# Patient Record
Sex: FEMALE | Race: WHITE | Employment: FULL TIME | URBAN - METROPOLITAN AREA
[De-identification: names, ages, dates, MRNs, and addresses within clinical notes are randomized per-mention and may not be internally consistent; named-entity substitution may affect disease eponyms.]

---

## 2017-01-28 ENCOUNTER — APPOINTMENT (OUTPATIENT)
Dept: CT IMAGING | Facility: CLINIC | Age: 42
End: 2017-01-28
Attending: EMERGENCY MEDICINE
Payer: COMMERCIAL

## 2017-01-28 ENCOUNTER — HOSPITAL ENCOUNTER (EMERGENCY)
Facility: CLINIC | Age: 42
Discharge: HOME | End: 2017-01-28
Attending: EMERGENCY MEDICINE | Admitting: EMERGENCY MEDICINE
Payer: COMMERCIAL

## 2017-01-28 VITALS
TEMPERATURE: 98.1 F | BODY MASS INDEX: 30.31 KG/M2 | DIASTOLIC BLOOD PRESSURE: 90 MMHG | OXYGEN SATURATION: 97 % | WEIGHT: 185 LBS | SYSTOLIC BLOOD PRESSURE: 144 MMHG | RESPIRATION RATE: 16 BRPM

## 2017-01-28 DIAGNOSIS — R22.0 FACIAL SWELLING: ICD-10-CM

## 2017-01-28 DIAGNOSIS — K12.2 ORAL CELLULITIS: ICD-10-CM

## 2017-01-28 LAB
ANION GAP SERPL CALCULATED.3IONS-SCNC: 8 MMOL/L (ref 3–14)
BASOPHILS # BLD AUTO: 0 10E9/L (ref 0–0.2)
BASOPHILS NFR BLD AUTO: 0.5 %
BUN SERPL-MCNC: 8 MG/DL (ref 7–30)
CALCIUM SERPL-MCNC: 7.7 MG/DL (ref 8.5–10.1)
CHLORIDE SERPL-SCNC: 113 MMOL/L (ref 94–109)
CO2 SERPL-SCNC: 23 MMOL/L (ref 20–32)
CREAT SERPL-MCNC: 0.66 MG/DL (ref 0.52–1.04)
DIFFERENTIAL METHOD BLD: NORMAL
EOSINOPHIL # BLD AUTO: 0.1 10E9/L (ref 0–0.7)
EOSINOPHIL NFR BLD AUTO: 1 %
ERYTHROCYTE [DISTWIDTH] IN BLOOD BY AUTOMATED COUNT: 12.5 % (ref 10–15)
GFR SERPL CREATININE-BSD FRML MDRD: ABNORMAL ML/MIN/1.7M2
GLUCOSE SERPL-MCNC: 70 MG/DL (ref 70–99)
HCT VFR BLD AUTO: 35.7 % (ref 35–47)
HGB BLD-MCNC: 11.9 G/DL (ref 11.7–15.7)
IMM GRANULOCYTES # BLD: 0 10E9/L (ref 0–0.4)
IMM GRANULOCYTES NFR BLD: 0.2 %
LYMPHOCYTES # BLD AUTO: 1.9 10E9/L (ref 0.8–5.3)
LYMPHOCYTES NFR BLD AUTO: 30 %
MCH RBC QN AUTO: 30.1 PG (ref 26.5–33)
MCHC RBC AUTO-ENTMCNC: 33.3 G/DL (ref 31.5–36.5)
MCV RBC AUTO: 90 FL (ref 78–100)
MONOCYTES # BLD AUTO: 0.4 10E9/L (ref 0–1.3)
MONOCYTES NFR BLD AUTO: 6.4 %
NEUTROPHILS # BLD AUTO: 3.9 10E9/L (ref 1.6–8.3)
NEUTROPHILS NFR BLD AUTO: 61.9 %
PLATELET # BLD AUTO: 204 10E9/L (ref 150–450)
POTASSIUM SERPL-SCNC: 3.3 MMOL/L (ref 3.4–5.3)
RBC # BLD AUTO: 3.96 10E12/L (ref 3.8–5.2)
SODIUM SERPL-SCNC: 144 MMOL/L (ref 133–144)
WBC # BLD AUTO: 6.3 10E9/L (ref 4–11)

## 2017-01-28 PROCEDURE — 99284 EMERGENCY DEPT VISIT MOD MDM: CPT | Performed by: EMERGENCY MEDICINE

## 2017-01-28 PROCEDURE — 99285 EMERGENCY DEPT VISIT HI MDM: CPT | Mod: 25

## 2017-01-28 PROCEDURE — 96365 THER/PROPH/DIAG IV INF INIT: CPT | Mod: 59

## 2017-01-28 PROCEDURE — 80048 BASIC METABOLIC PNL TOTAL CA: CPT | Performed by: EMERGENCY MEDICINE

## 2017-01-28 PROCEDURE — 25000128 H RX IP 250 OP 636: Performed by: EMERGENCY MEDICINE

## 2017-01-28 PROCEDURE — 96375 TX/PRO/DX INJ NEW DRUG ADDON: CPT | Mod: 59

## 2017-01-28 PROCEDURE — 25500064 ZZH RX 255 OP 636: Performed by: RADIOLOGY

## 2017-01-28 PROCEDURE — 25000125 ZZHC RX 250: Performed by: EMERGENCY MEDICINE

## 2017-01-28 PROCEDURE — 70487 CT MAXILLOFACIAL W/DYE: CPT

## 2017-01-28 PROCEDURE — 25000125 ZZHC RX 250: Performed by: RADIOLOGY

## 2017-01-28 PROCEDURE — 85025 COMPLETE CBC W/AUTO DIFF WBC: CPT | Performed by: EMERGENCY MEDICINE

## 2017-01-28 PROCEDURE — S0077 INJECTION, CLINDAMYCIN PHOSP: HCPCS | Performed by: EMERGENCY MEDICINE

## 2017-01-28 PROCEDURE — 96361 HYDRATE IV INFUSION ADD-ON: CPT

## 2017-01-28 RX ORDER — DEXAMETHASONE SODIUM PHOSPHATE 10 MG/ML
10 INJECTION, SOLUTION INTRAMUSCULAR; INTRAVENOUS ONCE
Status: COMPLETED | OUTPATIENT
Start: 2017-01-28 | End: 2017-01-28

## 2017-01-28 RX ORDER — IOPAMIDOL 755 MG/ML
80 INJECTION, SOLUTION INTRAVASCULAR ONCE
Status: COMPLETED | OUTPATIENT
Start: 2017-01-28 | End: 2017-01-28

## 2017-01-28 RX ORDER — DIPHENHYDRAMINE HYDROCHLORIDE 50 MG/ML
50 INJECTION INTRAMUSCULAR; INTRAVENOUS ONCE
Status: COMPLETED | OUTPATIENT
Start: 2017-01-28 | End: 2017-01-28

## 2017-01-28 RX ORDER — SODIUM CHLORIDE 9 MG/ML
1000 INJECTION, SOLUTION INTRAVENOUS CONTINUOUS
Status: DISCONTINUED | OUTPATIENT
Start: 2017-01-28 | End: 2017-01-28 | Stop reason: HOSPADM

## 2017-01-28 RX ORDER — CLINDAMYCIN HCL 300 MG
300 CAPSULE ORAL 3 TIMES DAILY
Qty: 21 CAPSULE | Refills: 0 | Status: SHIPPED | OUTPATIENT
Start: 2017-01-28 | End: 2017-02-04

## 2017-01-28 RX ORDER — CLINDAMYCIN PHOSPHATE 900 MG/50ML
900 INJECTION, SOLUTION INTRAVENOUS EVERY 8 HOURS
Status: DISCONTINUED | OUTPATIENT
Start: 2017-01-28 | End: 2017-01-28 | Stop reason: HOSPADM

## 2017-01-28 RX ADMIN — DIPHENHYDRAMINE HYDROCHLORIDE 50 MG: 50 INJECTION, SOLUTION INTRAMUSCULAR; INTRAVENOUS at 12:12

## 2017-01-28 RX ADMIN — CLINDAMYCIN PHOSPHATE 900 MG: 18 INJECTION, SOLUTION INTRAVENOUS at 14:18

## 2017-01-28 RX ADMIN — SODIUM CHLORIDE 1000 ML: 9 INJECTION, SOLUTION INTRAVENOUS at 12:13

## 2017-01-28 RX ADMIN — SODIUM CHLORIDE 80 ML: 9 INJECTION, SOLUTION INTRAVENOUS at 12:36

## 2017-01-28 RX ADMIN — IOPAMIDOL 80 ML: 755 INJECTION, SOLUTION INTRAVENOUS at 12:36

## 2017-01-28 RX ADMIN — DEXAMETHASONE SODIUM PHOSPHATE 10 MG: 10 INJECTION, SOLUTION INTRAMUSCULAR; INTRAVENOUS at 12:29

## 2017-01-28 ASSESSMENT — ENCOUNTER SYMPTOMS
CHILLS: 0
FEVER: 0
SPEECH DIFFICULTY: 0
SHORTNESS OF BREATH: 0
TROUBLE SWALLOWING: 0
VOICE CHANGE: 0
RHINORRHEA: 0

## 2017-01-28 NOTE — ED NOTES
Patient denies airway compromise or swallowing difficulties, patient states that the swelling on L side of her face is worse today and she noticed it more in her lips. She states that for the last week or so she has noticed some teeth sensitivity and teeth pain on the L side. Denies cracked teeth or injury to teeth. No redness noted, denies pain.

## 2017-01-28 NOTE — ED AVS SNAPSHOT
[facility] Emergency Department    [address] [address]    Phone:  [phone]    Fax:  [phone]                                       Dorina Lee   MRN: [id number]    Department:  [facility] Emergency Department   Date of Visit:  1/28/2017           After Visit Summary Signature Page     I have received my discharge instructions, and my questions have been answered. I have discussed any challenges I see with this plan with the nurse or doctor.    ..........................................................................................................................................  Patient/Patient Representative Signature      ..........................................................................................................................................  Patient Representative Print Name and Relationship to Patient    ..................................................               ................................................  Date                                            Time    ..........................................................................................................................................  Reviewed by Signature/Title    ...................................................              ..............................................  Date                                                            Time

## 2017-01-28 NOTE — DISCHARGE INSTRUCTIONS
Facial Cellulitis  Cellulitis is an infection of the deep layers of skin. A break in the skin, such as a cut or scratch, can let bacteria under the skin. It may also occur from an infected oil gland (pimple) or hair follicle. If the bacteria get to deep layers of the skin, it can be serious. If not treated, cellulitis can get into the bloodstream and lymph nodes. The infection can then spread throughout the body. This causes serious illness.  Cellulitis causes the affected skin to become red, swollen, warm, and sore. The reddened areas have a visible border. An open sore may leak fluid (pus). You may have a fever, chills, and pain.  Cellulitis is treated with antibiotics taken for 7 to 10 days. An open sore may be cleaned and covered with cool wet gauze. Symptoms should get better 1 to 2 days after treatment is started. Make sure to take all the antibiotics for the full number of days until they are gone. Keep taking the medication even if your symptoms go away.  Home care  Follow these tips:    Take all of the antibiotic medicine exactly as directed until it is gone. Don t miss any doses, especially during the first 7 days. Don t stop taking it when your symptoms get better.    Use a cool compress (face cloth soaked in cool water) on your face to help reduce swelling and pain.    You may use acetaminophen or ibuprofen to reduce pain. Don t use these if you have chronic liver or kidney disease, or ever had a stomach ulcer or GI bleeding. Talk with your healthcare provider first.  Follow-up care  Follow up with your healthcare provider. If your infection does not go away on 1 antibiotic, your healthcare provider will prescribe a different one.  When to seek medical advice  Call your healthcare provider right away if any of these occur:    Fever higher of 100.4  F (38.0  C) or higher after 2 days on antibiotics    Red areas that spread    Swelling or pain that gets worse    Fluid leaking from the skin (pus)    An  eyelid that swells shut or leaks fluid (pus)    Headache or neck pain that gets worse    Unusual drowsiness or confusion    Convulsions (seizure)    Change in eyesight             4494-8235 [employer]. [address] [place] [address], [place] [address]. All rights reserved. This information is not intended as a substitute for professional medical care. Always follow your healthcare professional's instructions.

## 2017-01-28 NOTE — ED NOTES
facial swelling began yesterday and is worse today with lips involved - no problem with breathing or swallowing

## 2017-01-28 NOTE — ED PROVIDER NOTES
History     Chief Complaint   Patient presents with     Facial Swelling     facial swelling began yesterday and is worse today with lips involved - no problem with breathing or swallowing     HPI  Dorina Lee is a 41 year old female who presents to the ED for evaluation of facial swelling noticed when she woke up yesterday morning. This morning her face was still swollen and had spread to her upper lip. She reports the swelling is not painful but she does have some pain in her upper left teeth. Speech and voice are normal. Denies fevers, chills, sinus congestion or drainage, trouble breathing, or shortness of breath. Denies any new foods, medication, lotions/soaps, or clothing. She has never had swelling like this before.       No past medical history on file.  Patient Active Problem List   Diagnosis     External bleeding hemorrhoids     Routine general medical examination at a health care facility     Current Facility-Administered Medications   Medication     0.9% sodium chloride infusion     clindamycin (CLEOCIN) infusion 900 mg     Current Outpatient Prescriptions   Medication     IBUPROFEN PO     clindamycin (CLEOCIN) 300 MG capsule      No Known Allergies  Social History     Social History     Marital Status:      Spouse Name: N/A     Number of Children: N/A     Years of Education: N/A     Occupational History     Not on file.     Social History Main Topics     Smoking status: Former Smoker -- 0.50 packs/day     Quit date: 01/01/2007     Smokeless tobacco: Never Used      Comment: smoked for 10 years     Alcohol Use: Yes      Comment: one drink every 6 months     Drug Use: No     Sexual Activity:     Partners: Male     Other Topics Concern      Service No     Caffeine Concern No     2 pops and 2 coffees per day     Occupational Exposure No     Hobby Hazards No     Sleep Concern No     Stress Concern No     Weight Concern No     Special Diet No     Back Care No     Exercise No      Bike Helmet No     Seat Belt Yes     Self-Exams Yes     Parent/Sibling W/ Cabg, Mi Or Angioplasty Before 65f 55m? No     Social History Narrative     Family History   Problem Relation Age of Onset     Unknown/Adopted Other      Neurologic Disorder Mother      Desire's  at 41     Neurologic Disorder Maternal Grandmother      desire's      CANCER Father      I have reviewed the Medications, Allergies, Past Medical and Surgical History, and Social History in the Epic system.    Review of Systems   Constitutional: Negative for fever and chills.   HENT: Positive for dental problem (tenderness of upper left teeth). Negative for congestion, rhinorrhea, trouble swallowing and voice change.    Respiratory: Negative for shortness of breath.    Neurological: Negative for speech difficulty.   All other systems reviewed and are negative.      Physical Exam   BP: (!) 176/116 mmHg  Heart Rate: 73  Temp: 98.1  F (36.7  C)  Resp: 16  Weight: 83.915 kg (185 lb)  SpO2: 99 %  Physical Exam Gen. alert cooperative female in mild distress.  HEENT showed pupils are equal to light and accommodation.  Extraocular motions are intact.  There is no injection or mattering.  Ears are clear bilaterally.  There is subtle swelling under the left eye and onto the cheek area.  There is no open draining lesions or blisters noted.  In the left nasal passage there is a polypoid swelling that may be a polyp or turbinate that is inflamed.  This is not noted in the right side however.  Orally her dentition is in good repair.  Mucosa is moist.  On palpation of the left upper gumline she is mildly tender.  It is erythematous but nonfluctuant.  Patient has no malocclusion.  Neck reveals no adenopathy or stridor.  There is no swelling.  No meningismus.  On auscultation of lungs or wheezing.  Cardiac exam is normal.  She has no other rashes or lesions.    ED Course   Procedures         Results for orders placed or performed during the hospital  encounter of 01/28/17   CT Maxillofacial w Contrast    Narrative    CT OF THE FACE WITH CONTRAST 1/28/2017 12:45 PM     COMPARISON: None.    HISTORY: Left facial swelling/polypoid lesion in the left nasal  passage.    TECHNIQUE:  Helical thin-section axial CT images of the face were  acquired after the administration of 80 mL Isovue 370 intravenous  contrast. Coronal reconstructions were created from the axial source  data.    FINDINGS: There is no evidence for mass, fluid collection, or abscess  anywhere in the visualized portions of the face or upper neck. There  is mild inflammatory fat stranding of the left cheek consistent with  cellulitis. There is no evidence for mass lesion or bony abnormality  in the nasal cavity on either side. The orbits and globes bilaterally  are unremarkable. The paranasal sinuses are well aerated. There are no  fractures of the visualized bones.      Impression    IMPRESSION:  1. Inflammatory fat stranding of the left cheek consistent with  cellulitis.  2. No evidence for mass, fluid collection or lymphadenopathy in the  visualized portions of the face or upper neck.    Radiation dose for this scan was reduced using automated exposure  control, adjustment of the mA and/or kV according to patient size, or  iterative reconstruction technique.            Critical Care time:  none               Labs Ordered and Resulted from Time of ED Arrival Up to the Time of Departure from the ED   BASIC METABOLIC PANEL - Abnormal; Notable for the following:     Potassium 3.3 (*)     Chloride 113 (*)     Calcium 7.7 (*)     All other components within normal limits   CBC WITH PLATELETS DIFFERENTIAL       Results for orders placed or performed during the hospital encounter of 01/28/17 (from the past 24 hour(s))   CT Maxillofacial w Contrast    Narrative    CT OF THE FACE WITH CONTRAST 1/28/2017 12:45 PM     COMPARISON: None.    HISTORY: Left facial swelling/polypoid lesion in the left  nasal  passage.    TECHNIQUE:  Helical thin-section axial CT images of the face were  acquired after the administration of 80 mL Isovue 370 intravenous  contrast. Coronal reconstructions were created from the axial source  data.    FINDINGS: There is no evidence for mass, fluid collection, or abscess  anywhere in the visualized portions of the face or upper neck. There  is mild inflammatory fat stranding of the left cheek consistent with  cellulitis. There is no evidence for mass lesion or bony abnormality  in the nasal cavity on either side. The orbits and globes bilaterally  are unremarkable. The paranasal sinuses are well aerated. There are no  fractures of the visualized bones.      Impression    IMPRESSION:  1. Inflammatory fat stranding of the left cheek consistent with  cellulitis.  2. No evidence for mass, fluid collection or lymphadenopathy in the  visualized portions of the face or upper neck.    Radiation dose for this scan was reduced using automated exposure  control, adjustment of the mA and/or kV according to patient size, or  iterative reconstruction technique.   CBC with platelets differential   Result Value Ref Range    WBC 6.3 4.0 - 11.0 10e9/L    RBC Count 3.96 3.8 - 5.2 10e12/L    Hemoglobin 11.9 11.7 - 15.7 g/dL    Hematocrit 35.7 35.0 - 47.0 %    MCV 90 78 - 100 fl    MCH 30.1 26.5 - 33.0 pg    MCHC 33.3 31.5 - 36.5 g/dL    RDW 12.5 10.0 - 15.0 %    Platelet Count 204 150 - 450 10e9/L    Diff Method Automated Method     % Neutrophils 61.9 %    % Lymphocytes 30.0 %    % Monocytes 6.4 %    % Eosinophils 1.0 %    % Basophils 0.5 %    % Immature Granulocytes 0.2 %    Absolute Neutrophil 3.9 1.6 - 8.3 10e9/L    Absolute Lymphocytes 1.9 0.8 - 5.3 10e9/L    Absolute Monocytes 0.4 0.0 - 1.3 10e9/L    Absolute Eosinophils 0.1 0.0 - 0.7 10e9/L    Absolute Basophils 0.0 0.0 - 0.2 10e9/L    Abs Immature Granulocytes 0.0 0 - 0.4 10e9/L   Basic metabolic panel   Result Value Ref Range    Sodium 144 133 -  144 mmol/L    Potassium 3.3 (L) 3.4 - 5.3 mmol/L    Chloride 113 (H) 94 - 109 mmol/L    Carbon Dioxide 23 20 - 32 mmol/L    Anion Gap 8 3 - 14 mmol/L    Glucose 70 70 - 99 mg/dL    Urea Nitrogen 8 7 - 30 mg/dL    Creatinine 0.66 0.52 - 1.04 mg/dL    GFR Estimate >90  Non  GFR Calc   >60 mL/min/1.7m2    GFR Estimate If Black >90   GFR Calc   >60 mL/min/1.7m2    Calcium 7.7 (L) 8.5 - 10.1 mg/dL       Medications   0.9% sodium chloride BOLUS (0 mLs Intravenous Stopped 1/28/17 1413)     Followed by   0.9% sodium chloride infusion (not administered)   clindamycin (CLEOCIN) infusion 900 mg (0 mg Intravenous Stopped 1/28/17 1507)   diphenhydrAMINE (BENADRYL) injection 50 mg (50 mg Intravenous Given 1/28/17 1212)   dexamethasone (DECADRON) injection 10 mg (10 mg Intravenous Given 1/28/17 1229)   iopamidol (ISOVUE-370) solution 80 mL (80 mLs Intravenous Given 1/28/17 1236)   sodium chloride 0.9 % for CT scan flush dose 80 mL (80 mLs Intravenous Given 1/28/17 1236)       11:34 AM Patient assessed.  Patient had the IV established and was given Benadryl and steroids for possible allergic reaction.  With the left nares lesion being somewhat irregular for a polyp or turbinate and no evidence of similar disease in the right nares a CT of the face with contrast was obtained.  Results of the CT showed inflammatory stranding of the left cheek which was consistent with cellulitis.  There was no evidence of mass, fluid collection or lymphadenopathy in the slice portion of the face or upper neck.  They did not notice any worrisome mass lesion or bony abnormality in the nasal cavity.  Patient was informed of these findings.  She is given IV clindamycin to cover for oral gloria.  Assessments & Plan (with Medical Decision Making)   Patient is a 40-year-old female presents with one-day history of left facial swelling.  Began yesterday but progressed now to the point with the upper lip is involved.  Mild  discomfort of her upper gumline on the left.  No difficulty with speech or swallowing.  No shortness of breath or wheezing.  No similar presentations previously.  No history of sinus problems and currently denies any sinus discharge.  No fever or chills.  No treatment prior to arrival.  On exam patient is subtle swelling from below her left eye onto the cheek area.  Her upper lip also looks slightly swollen compared to the right side.  Initially patient was treated for allergic reaction with Benadryl and steroids.  However with the abnormal finding in her left nares a CT was obtained to assess.  This did show some fat stranding in the cheek but did not show evidence of abscess.  There is no worrisome lesion noted in the nasal passages and no bony abnormalities.  Patient is a former smoker.  Patient was treated for probable cellulitis.  She is given clindamycin to cover for oral gloria.  Handout on oral cellulitis is provided.  Reasons to return for reassessment I've discussed.  I have reviewed the nursing notes.    I have reviewed the findings, diagnosis, plan and need for follow up with the patient.    New Prescriptions    CLINDAMYCIN (CLEOCIN) 300 MG CAPSULE    Take 1 capsule (300 mg) by mouth 3 times daily for 7 days       Final diagnoses:   Facial swelling   Oral cellulitis     This document serves as a record of the services and decisions personally performed and made by Vasiliy Barron MD. It was created on HIS/HER behalf by Ashanti Aguilar, a trained medical scribe. The creation of this document is based the provider's statements to the medical scribe.  Ashanti Aguilar 11:34 AM 1/28/2017    Provider:   The information in this document, created by the medical scribe for me, accurately reflects the services I personally performed and the decisions made by me. I have reviewed and approved this document for accuracy prior to leaving the patient care area.  Vasiliy Barron MD 11:34 AM 1/28/2017 1/28/2017   [facility]  EMERGENCY DEPARTMENT      Vasiliy Barron MD  01/28/17 [id number]

## 2017-01-28 NOTE — ED AVS SNAPSHOT
[facility]    [address] [facility] [address]    Phone:  [phone]    Fax:  [phone]                                       Dorina Lee   MRN: [id number]    Department:  [facility]   Date of Visit:  1/28/2017           Patient Information     Date Of Birth          1975        Your diagnoses for this visit were:     Facial swelling     Oral cellulitis        You were seen by Vasiliy Barron MD.      Follow-up Information     Follow up with Clinic, [facility].    Why:  As needed    Contact information:    [address]  [facility] [address] [phone]          Discharge Instructions         Facial Cellulitis  Cellulitis is an infection of the deep layers of skin. A break in the skin, such as a cut or scratch, can let bacteria under the skin. It may also occur from an infected oil gland (pimple) or hair follicle. If the bacteria get to deep layers of the skin, it can be serious. If not treated, cellulitis can get into the bloodstream and lymph nodes. The infection can then spread throughout the body. This causes serious illness.  Cellulitis causes the affected skin to become red, swollen, warm, and sore. The reddened areas have a visible border. An open sore may leak fluid (pus). You may have a fever, chills, and pain.  Cellulitis is treated with antibiotics taken for 7 to 10 days. An open sore may be cleaned and covered with cool wet gauze. Symptoms should get better 1 to 2 days after treatment is started. Make sure to take all the antibiotics for the full number of days until they are gone. Keep taking the medication even if your symptoms go away.  Home care  Follow these tips:    Take all of the antibiotic medicine exactly as directed until it is gone. Don t miss any doses, especially during the first 7 days. Don t stop taking it when your symptoms get better.    Use a cool compress (face cloth soaked in  cool water) on your face to help reduce swelling and pain.    You may use acetaminophen or ibuprofen to reduce pain. Don t use these if you have chronic liver or kidney disease, or ever had a stomach ulcer or GI bleeding. Talk with your healthcare provider first.  Follow-up care  Follow up with your healthcare provider. If your infection does not go away on 1 antibiotic, your healthcare provider will prescribe a different one.  When to seek medical advice  Call your healthcare provider right away if any of these occur:    Fever higher of 100.4  F (38.0  C) or higher after 2 days on antibiotics    Red areas that spread    Swelling or pain that gets worse    Fluid leaking from the skin (pus)    An eyelid that swells shut or leaks fluid (pus)    Headache or neck pain that gets worse    Unusual drowsiness or confusion    Convulsions (seizure)    Change in eyesight             8668-1434 [employer]. [address] [place] [address]. All rights reserved. This information is not intended as a substitute for professional medical care. Always follow your healthcare professional's instructions.          24 Hour Appointment Hotline       To make an appointment at any [facility], call [phone] ([phone]). If you don't have a family doctor or clinic, we will help you find one. [facility] are conveniently located to serve the needs of you and your family.             Review of your medicines      START taking        Dose / Directions Last dose taken    clindamycin 300 MG capsule   Commonly known as:  CLEOCIN   Dose:  300 mg   Quantity:  21 capsule        Take 1 capsule (300 mg) by mouth 3 times daily for 7 days   Refills:  0          Our records show that you are taking the medicines listed below. If these are incorrect, please call your family doctor or clinic.        Dose / Directions Last dose taken    IBUPROFEN PO   Dose:  600 mg        Take 600 mg by mouth daily as needed for  moderate pain   Refills:  0                Prescriptions were sent or printed at these locations (1 Prescription)                   [facility] [place], [place] - [address]   [address], [place] [address]    Telephone:  [phone]   Fax:  [phone]   Hours:                  E-Prescribed (1 of 1)         clindamycin (CLEOCIN) 300 MG capsule                Procedures and tests performed during your visit     Basic metabolic panel    CBC with platelets differential    CT Maxillofacial w Contrast      Orders Needing Specimen Collection     None      Pending Results     Date and Time Order Name Status Description    1/28/2017 1210 CT Maxillofacial w Contrast Preliminary             Pending Culture Results     No orders found from 1/27/2017 to 1/29/2017.       Test Results from your hospital stay           1/28/2017  2:02 PM - Interface, Radiant Ib      Narrative     CT OF THE FACE WITH CONTRAST 1/28/2017 12:45 PM     COMPARISON: None.    HISTORY: Left facial swelling/polypoid lesion in the left nasal  passage.    TECHNIQUE:  Helical thin-section axial CT images of the face were  acquired after the administration of 80 mL Isovue 370 intravenous  contrast. Coronal reconstructions were created from the axial source  data.    FINDINGS: There is no evidence for mass, fluid collection, or abscess  anywhere in the visualized portions of the face or upper neck. There  is mild inflammatory fat stranding of the left cheek consistent with  cellulitis. There is no evidence for mass lesion or bony abnormality  in the nasal cavity on either side. The orbits and globes bilaterally  are unremarkable. The paranasal sinuses are well aerated. There are no  fractures of the visualized bones.        Impression     IMPRESSION:  1. Inflammatory fat stranding of the left cheek consistent with  cellulitis.  2. No evidence for mass, fluid collection or lymphadenopathy in the  visualized portions of the face or  upper neck.    Radiation dose for this scan was reduced using automated exposure  control, adjustment of the mA and/or kV according to patient size, or  iterative reconstruction technique.         1/28/2017  1:34 PM - Interface, Flexilab Results      Component Results     Component Value Ref Range & Units Status    WBC 6.3 4.0 - 11.0 10e9/L Final    RBC Count 3.96 3.8 - 5.2 10e12/L Final    Hemoglobin 11.9 11.7 - 15.7 g/dL Final    Hematocrit 35.7 35.0 - 47.0 % Final    MCV 90 78 - 100 fl Final    MCH 30.1 26.5 - 33.0 pg Final    MCHC 33.3 31.5 - 36.5 g/dL Final    RDW 12.5 10.0 - 15.0 % Final    Platelet Count 204 150 - 450 10e9/L Final    Diff Method Automated Method  Final    % Neutrophils 61.9 % Final    % Lymphocytes 30.0 % Final    % Monocytes 6.4 % Final    % Eosinophils 1.0 % Final    % Basophils 0.5 % Final    % Immature Granulocytes 0.2 % Final    Absolute Neutrophil 3.9 1.6 - 8.3 10e9/L Final    Absolute Lymphocytes 1.9 0.8 - 5.3 10e9/L Final    Absolute Monocytes 0.4 0.0 - 1.3 10e9/L Final    Absolute Eosinophils 0.1 0.0 - 0.7 10e9/L Final    Absolute Basophils 0.0 0.0 - 0.2 10e9/L Final    Abs Immature Granulocytes 0.0 0 - 0.4 10e9/L Final         1/28/2017  1:40 PM - Interface, Flexilab Results      Component Results     Component Value Ref Range & Units Status    Sodium 144 133 - 144 mmol/L Final    Potassium 3.3 (L) 3.4 - 5.3 mmol/L Final    Chloride 113 (H) 94 - 109 mmol/L Final    Carbon Dioxide 23 20 - 32 mmol/L Final    Anion Gap 8 3 - 14 mmol/L Final    Glucose 70 70 - 99 mg/dL Final    Urea Nitrogen 8 7 - 30 mg/dL Final    Creatinine 0.66 0.52 - 1.04 mg/dL Final    GFR Estimate >90  Non  GFR Calc   >60 mL/min/1.7m2 Final    GFR Estimate If Black >90   GFR Calc   >60 mL/min/1.7m2 Final    Calcium 7.7 (L) 8.5 - 10.1 mg/dL Final                Thank you for choosing [facility]       Thank you for choosing [facility] for your care. Our goal is always to provide you with  "excellent care. Hearing back from our patients is one way we can continue to improve our services. Please take a few minutes to complete the written survey that you may receive in the mail after you visit with us. Thank you!        [employer] Information     [employer] lets you send messages to your doctor, view your test results, renew your prescriptions, schedule appointments and more. To sign up, go to [email or web address] . Click on \"Log in\" on the left side of the screen, which will take you to the Welcome page. Then click on \"Sign up Now\" on the right side of the page.     You will be asked to enter the access code listed below, as well as some personal information. Please follow the directions to create your username and password.     Your access code is: [email or web address]  Expires: 2017  3:28 PM     Your access code will  in 90 days. If you need help or a new code, please call your [place] clinic or [phone].        Care EveryWhere ID     This is your Care EveryWhere ID. This could be used by other organizations to access your [place] medical records  [id number]        After Visit Summary       This is your record. Keep this with you and show to your community pharmacist(s) and doctor(s) at your next visit.                  "

## 2017-06-30 ENCOUNTER — OFFICE VISIT (OUTPATIENT)
Dept: FAMILY MEDICINE | Facility: CLINIC | Age: 42
End: 2017-06-30
Payer: COMMERCIAL

## 2017-06-30 VITALS
HEIGHT: 66 IN | DIASTOLIC BLOOD PRESSURE: 97 MMHG | HEART RATE: 73 BPM | SYSTOLIC BLOOD PRESSURE: 148 MMHG | WEIGHT: 196.7 LBS | TEMPERATURE: 98.3 F | BODY MASS INDEX: 31.61 KG/M2

## 2017-06-30 DIAGNOSIS — K13.79 ORAL PAIN: ICD-10-CM

## 2017-06-30 DIAGNOSIS — L60.0 INGROWN NAIL: ICD-10-CM

## 2017-06-30 DIAGNOSIS — L03.032 PARONYCHIA OF GREAT TOE, LEFT: Primary | ICD-10-CM

## 2017-06-30 PROCEDURE — 99213 OFFICE O/P EST LOW 20 MIN: CPT | Performed by: NURSE PRACTITIONER

## 2017-06-30 RX ORDER — CEPHALEXIN 500 MG/1
500 CAPSULE ORAL 3 TIMES DAILY
Qty: 21 CAPSULE | Refills: 0 | Status: SHIPPED | OUTPATIENT
Start: 2017-06-30 | End: 2017-07-06

## 2017-06-30 NOTE — PROGRESS NOTES
"  SUBJECTIVE:                                                    Dorina Lee is a 42 year old female who presents to clinic today for the following health issues:  Swelling and erythema of the left great toe. History of ingrown toenail.   Also complains of sore in the mouth, upper left gum area, recently had facial swelling on the same left side and was seen in ED, was diagnosed with facial cellulitis, treated with Cleocin.     Concern - infected toe   Onset: one month     Description:   Swollen, red     Intensity: 5/10    Progression of Symptoms:  same    Accompanying Signs & Symptoms:  None     Previous history of similar problem:   No    Precipitating factors:   Worsened by: half way through the day she is in pain     Alleviating factors:  Improved by: Nothing     Therapies Tried and outcome: Soaked it in epsalm salt     Problem list and histories reviewed & adjusted, as indicated.  Additional history: as documented    Labs reviewed in EPIC    Reviewed and updated as needed this visit by clinical staff  Tobacco  Allergies  Med Hx  Surg Hx  Fam Hx  Soc Hx      Reviewed and updated as needed this visit by Provider         ROS:  Constitutional, HEENT, cardiovascular, pulmonary, gi and gu systems are negative, except as otherwise noted.    OBJECTIVE:     BP (!) 148/97  Pulse 73  Temp 98.3  F (36.8  C) (Tympanic)  Ht 5' 5.5\" (1.664 m)  Wt 196 lb 11.2 oz (89.2 kg)  BMI 32.23 kg/m2  Body mass index is 32.23 kg/(m^2).  GENERAL: healthy, alert and no distress  HENT: oropharynx clear and oral mucous membranes moist, there is very small area of swelling on the left upper gum area, no erythema and discharge, possibly infected tooth, recommended dentist evaluation    MS: left great toe erythema and swelling and ingrown toenail   PSYCH: mentation appears normal, affect normal/bright    Diagnostic Test Results:  none     ASSESSMENT/PLAN:     1. Paronychia of great toe, left  -tried over the counter " Bacitracin cream without improvement   - cephALEXin (KEFLEX) 500 MG capsule; Take 1 capsule (500 mg) by mouth 3 times daily for 7 days  Dispense: 21 capsule; Refill: 0    2. Ingrown nail  -recommended to schedule with either podiatrist, or Dr. Gay   - ORTHO  REFERRAL    3. Oral pain  -possible tooth infection, recommended to see dentist as soon as possible  -start Keflex     See Patient Instructions    NEDRA Wolfe [facility]

## 2017-06-30 NOTE — PATIENT INSTRUCTIONS
Soak feet  Keflex 1 tablet 3 times daily for 7 days    See podiatrist, or Dr. Gay for ingrown toenail removal      See dentist

## 2017-06-30 NOTE — MR AVS SNAPSHOT
After Visit Summary   6/30/2017    Dorina Lee    MRN: [id number]           Patient Information     Date Of Birth          1975        Visit Information        Provider Department      6/30/2017 12:40 PM Estefania Nolen APRN CNP [facility]        Today's Diagnoses     Paronychia of great toe, left    -  1    Ingrown nail          Care Instructions    Soak feet  Keflex 1 tablet 3 times daily for 7 days    See podiatrist, or Dr. Gay for ingrown toenail removal      See dentist                  Follow-ups after your visit        Additional Services     ORTHO  REFERRAL       [facility] is referring you to the Orthopedic  Services at [facility].       The  Representative will assist you in the coordination of your Orthopedic and Musculoskeletal Care as prescribed by your physician.    The  Representative will call you within 1 business day to help schedule your appointment, or you may contact the  Representative at:    All areas ~ [phone]     Type of Referral : [facility]     Podiatrist for ingrown toenail removal   Timeframe requested: 1 - 2 days    Coverage of these services is subject to the terms and limitations of your health insurance plan.  Please call member services at your health plan with any benefit or coverage questions.      If X-rays, CT or MRI's have been performed, please contact the facility where they were done to arrange for , prior to your scheduled appointment.  Please bring this referral request to your appointment and present it to your specialist.                  Who to contact     If you have questions or need follow up information about today's clinic visit or your schedule please contact [facility] directly at [phone].  Normal or non-critical lab and imaging results will be communicated to  "you by MyChart, letter or phone within 4 business days after the clinic has received the results. If you do not hear from us within 7 days, please contact the clinic through [employer] or phone. If you have a critical or abnormal lab result, we will notify you by phone as soon as possible.  Submit refill requests through [employer] or call your pharmacy and they will forward the refill request to us. Please allow 3 business days for your refill to be completed.          Additional Information About Your Visit        [employer] Information     [employer] lets you send messages to your doctor, view your test results, renew your prescriptions, schedule appointments and more. To sign up, go to [email or web address] . Click on \"Log in\" on the left side of the screen, which will take you to the Welcome page. Then click on \"Sign up Now\" on the right side of the page.     You will be asked to enter the access code listed below, as well as some personal information. Please follow the directions to create your username and password.     Your access code is: [email or web address]  Expires: 2017  1:01 PM     Your access code will  in 90 days. If you need help or a new code, please call your [facility] clinic or [phone].        Care EveryWhere ID     This is your Care EveryWhere ID. This could be used by other organizations to access your [facility] medical records  [id number]        Your Vitals Were     Pulse Temperature Height BMI (Body Mass Index)          73 98.3  F (36.8  C) (Tympanic) 5' 5.5\" (1.664 m) 32.23 kg/m2         Blood Pressure from Last 3 Encounters:   17 (!) 148/97   17 144/90   08/04/15 (!) 157/93    Weight from Last 3 Encounters:   17 196 lb 11.2 oz (89.2 kg)   17 185 lb (83.9 kg)   03/19/15 193 lb 3.2 oz (87.6 kg)              We Performed the Following     ORTHO  REFERRAL          Today's Medication Changes          These changes are accurate as of: 17  1:01 PM.  If you have " any questions, ask your nurse or doctor.               Start taking these medicines.        Dose/Directions    cephALEXin 500 MG capsule   Commonly known as:  KEFLEX   Used for:  Paronychia of great toe, left   Started by:  Estefania Nolen APRN CNP        Dose:  500 mg   Take 1 capsule (500 mg) by mouth 3 times daily for 7 days   Quantity:  21 capsule   Refills:  0            Where to get your medicines      These medications were sent to [facility] [place], [place] - [address]  [address] [address]     Phone:  [phone]     cephALEXin 500 MG capsule                Primary Care Provider Office Phone #    [facility] [phone] [address] [address]        Equal Access to Services     KARSON CHUNG : Bull acostao Soyaritza, waaxda luqadaha, qaybta kaalmada adeegyada, leonard freed. So [facility] [phone].    ATENCIÓN: Si habla español, tiene a toledo disposición servicios gratuitos de asistencia lingüística. Llame al [phone].    We comply with applicable federal civil rights laws and [place] laws. We do not discriminate on the basis of race, color, national origin, age, disability sex, sexual orientation or gender identity.            Thank you!     Thank you for choosing [facility]  for your care. Our goal is always to provide you with excellent care. Hearing back from our patients is one way we can continue to improve our services. Please take a few minutes to complete the written survey that you may receive in the mail after your visit with us. Thank you!             Your Updated Medication List - Protect others around you: Learn how to safely use, store and throw away your medicines at www.disposemymeds.org.          This list is accurate as of: 6/30/17  1:01 PM.  Always use your most recent med list.                   Brand Name Dispense Instructions for use  Diagnosis    cephALEXin 500 MG capsule    KEFLEX    21 capsule    Take 1 capsule (500 mg) by mouth 3 times daily for 7 days    Paronychia of great toe, left       IBUPROFEN PO      Take 600 mg by mouth daily as needed for moderate pain

## 2017-06-30 NOTE — NURSING NOTE
"Chief Complaint   Patient presents with     Toe Pain     Left big toe      Mouth Problem     Has a sore in her mouth that she would like looked at       Initial BP (!) 148/97  Pulse 73  Temp 98.3  F (36.8  C) (Tympanic)  Ht 5' 5.5\" (1.664 m)  Wt 196 lb 11.2 oz (89.2 kg)  BMI 32.23 kg/m2 Estimated body mass index is 32.23 kg/(m^2) as calculated from the following:    Height as of this encounter: 5' 5.5\" (1.664 m).    Weight as of this encounter: 196 lb 11.2 oz (89.2 kg).  Medication Reconciliation: complete  "

## 2017-07-06 ENCOUNTER — OFFICE VISIT (OUTPATIENT)
Dept: FAMILY MEDICINE | Facility: CLINIC | Age: 42
End: 2017-07-06
Payer: COMMERCIAL

## 2017-07-06 VITALS
TEMPERATURE: 98.6 F | HEIGHT: 66 IN | HEART RATE: 75 BPM | DIASTOLIC BLOOD PRESSURE: 95 MMHG | WEIGHT: 198 LBS | SYSTOLIC BLOOD PRESSURE: 141 MMHG | BODY MASS INDEX: 31.82 KG/M2

## 2017-07-06 DIAGNOSIS — L60.0 INGROWING TOENAIL: Primary | ICD-10-CM

## 2017-07-06 PROCEDURE — 99213 OFFICE O/P EST LOW 20 MIN: CPT | Mod: 25 | Performed by: FAMILY MEDICINE

## 2017-07-06 PROCEDURE — 11730 AVULSION NAIL PLATE SIMPLE 1: CPT | Performed by: FAMILY MEDICINE

## 2017-07-06 NOTE — MR AVS SNAPSHOT
"              After Visit Summary   7/6/2017    Dorina Lee    MRN: [id number]           Patient Information     Date Of Birth          1975        Visit Information        Provider Department      7/6/2017 2:00 PM Anirudh Gay MD [facility]        Today's Diagnoses     Ingrowing toenail    -  1      Care Instructions    (L60.0) Ingrowing toenail  (primary encounter diagnosis)  Comment:   Plan: REMOVAL OF NAIL PLATE SIMPLE SINGLE        We discussed the options and used Betadine for local prep and then 1% Lidocaine for local anesthesia.   The lateral one fourth of the nail was loosened and excised. Dressing placed and instructions for follow up given.             Follow-ups after your visit        Who to contact     If you have questions or need follow up information about today's clinic visit or your schedule please contact [facility] directly at [phone].  Normal or non-critical lab and imaging results will be communicated to you by MyChart, letter or phone within 4 business days after the clinic has received the results. If you do not hear from us within 7 days, please contact the clinic through MyChart or phone. If you have a critical or abnormal lab result, we will notify you by phone as soon as possible.  Submit refill requests through "[employer] or call your pharmacy and they will forward the refill request to us. Please allow 3 business days for your refill to be completed.          Additional Information About Your Visit        MyChart Information     "[employer] lets you send messages to your doctor, view your test results, renew your prescriptions, schedule appointments and more. To sign up, go to [email or web address] . Click on \"Log in\" on the left side of the screen, which will take you to the Welcome page. Then click on \"Sign up Now\" on the right side of the page.     You will be asked to enter the access code listed below, as well as some personal " "information. Please follow the directions to create your username and password.     Your access code is: [email or web address]  Expires: 2017  1:01 PM     Your access code will  in 90 days. If you need help or a new code, please call your [place] clinic or [phone].        Care EveryWhere ID     This is your Care EveryWhere ID. This could be used by other organizations to access your [facility] medical records  [id number]        Your Vitals Were     Pulse Temperature Height Breastfeeding? BMI (Body Mass Index)       75 98.6  F (37  C) (Tympanic) 5' 5.5\" (1.664 m) No 32.45 kg/m2        Blood Pressure from Last 3 Encounters:   17 (!) 141/95   17 (!) 148/97   17 144/90    Weight from Last 3 Encounters:   17 198 lb (89.8 kg)   17 196 lb 11.2 oz (89.2 kg)   17 185 lb (83.9 kg)              We Performed the Following     REMOVAL OF NAIL PLATE SIMPLE SINGLE        Primary Care Provider Office Phone #    [facility] [phone] [address] [address]        Equal Access to Services     KARSON CHUNG AH: Hadii timmy ku hadasho Soomaali, waaxda luqadaha, qaybta kaalmada adeegyada, waxay dale freed. So [facility] [phone].    ATENCIÓN: Si habla español, tiene a toledo disposición servicios gratuitos de asistencia lingüística. Llame al [phone].    We comply with applicable federal civil rights laws and [place] laws. We do not discriminate on the basis of race, color, national origin, age, disability sex, sexual orientation or gender identity.            Thank you!     Thank you for choosing [facility]  for your care. Our goal is always to provide you with excellent care. Hearing back from our patients is one way we can continue to improve our services. Please take a few minutes to complete the written survey that you may receive in the mail after your visit with us. Thank you!             Your Updated " Medication List - Protect others around you: Learn how to safely use, store and throw away your medicines at www.disposemymeds.org.          This list is accurate as of: 7/6/17  2:51 PM.  Always use your most recent med list.                   Brand Name Dispense Instructions for use Diagnosis    IBUPROFEN PO      Take 600 mg by mouth daily as needed for moderate pain

## 2017-07-06 NOTE — PATIENT INSTRUCTIONS
(L60.0) Ingrowing toenail  (primary encounter diagnosis)  Comment:   Plan: REMOVAL OF NAIL PLATE SIMPLE SINGLE        We discussed the options and used Betadine for local prep and then 1% Lidocaine for local anesthesia.   The lateral one fourth of the nail was loosened and excised. Dressing placed and instructions for follow up given.

## 2017-07-06 NOTE — PROGRESS NOTES
"  SUBJECTIVE:                                                    Dorina Lee is a 42 year old female who presents to clinic today for the following health issues:    Toenail Removal       Duration: getting worse in the last couple weeks     Description (location/character/radiation): pt is here today to get and ingrown toenail removed on her left foot big toe. Pt states it is red and can be painful the more she is on her feet. She has had this done in the past.          Current Outpatient Prescriptions:      IBUPROFEN PO, Take 600 mg by mouth daily as needed for moderate pain, Disp: , Rfl:     Patient Active Problem List   Diagnosis     External bleeding hemorrhoids     Routine general medical examination at a health care facility       Blood pressure (!) 141/95, pulse 75, temperature 98.6  F (37  C), temperature source Tympanic, height 5' 5.5\" (1.664 m), weight 198 lb (89.8 kg), not currently breastfeeding.    Exam:  GENERAL APPEARANCE: healthy, alert and no distress  EYES: EOMI,  PERRL  MS: tender to palpation on the right great toe medial aspect of the distal phalanx.   No purulence noted.   NEURO: Normal strength and tone, sensory exam grossly normal, mentation intact and speech normal  PSYCH: mentation appears normal and affect normal/bright    (L60.0) Ingrowing toenail  (primary encounter diagnosis)  Comment:   Plan: REMOVAL OF NAIL PLATE SIMPLE SINGLE        We discussed the options and used Betadine for local prep and then 1% Lidocaine for local anesthesia.   The lateral one fourth of the nail was loosened and excised. Dressing placed and instructions for follow up given.       Anirudh Gay      "

## 2017-07-06 NOTE — NURSING NOTE
"Initial BP (!) 141/95  Pulse 75  Temp 98.6  F (37  C) (Tympanic)  Ht 5' 5.5\" (1.664 m)  Wt 198 lb (89.8 kg)  Breastfeeding? No  BMI 32.45 kg/m2 Estimated body mass index is 32.45 kg/(m^2) as calculated from the following:    Height as of this encounter: 5' 5.5\" (1.664 m).    Weight as of this encounter: 198 lb (89.8 kg). .    Karen Driver, SHIELA ([facility])  "

## 2017-08-02 ENCOUNTER — TELEPHONE (OUTPATIENT)
Dept: FAMILY MEDICINE | Facility: CLINIC | Age: 42
End: 2017-08-02

## 2017-08-02 NOTE — LETTER
August 14, 2017        Dorina Lee  [address] [place] [address]        Dear Dorina,     Dear Dorina Lee,    Your clinic record indicates that you are due for Mammogram and Pap and physical exam. Please call the  at [phone] to schedule an appointment. Mammograms can be scheduled by calling Diagnostic Imaging at [phone].    If you have questions about this letter please contact your provider.    Sincerely,    Your [facility] Care Team,/cb

## 2017-08-07 NOTE — TELEPHONE ENCOUNTER
Left message for patient to call back. Due for Pappe/pe, mammogram, TD injection. Phi LEVINE CMA

## 2017-08-14 NOTE — TELEPHONE ENCOUNTER
Panel Management Review      Patient has the following on her problem list: None      Composite cancer screening  Chart review shows that this patient is due/due soon for the following Pap Smear and Mammogram  Summary:    Patient is due/failing the following:   MAMMOGRAM, PAP and PHYSICAL    Action needed:   Patient needs office visit for physical.pappe, due for mammogram .    Type of outreach:    Sent letter.    Questions for provider review:    None                                                                                                                                    Phi LEVINE CMA

## 2018-09-26 ENCOUNTER — APPOINTMENT (OUTPATIENT)
Dept: GENERAL RADIOLOGY | Facility: CLINIC | Age: 43
End: 2018-09-26
Attending: PHYSICIAN ASSISTANT
Payer: COMMERCIAL

## 2018-09-26 ENCOUNTER — HOSPITAL ENCOUNTER (EMERGENCY)
Facility: CLINIC | Age: 43
Discharge: HOME | End: 2018-09-26
Attending: PHYSICIAN ASSISTANT | Admitting: PHYSICIAN ASSISTANT
Payer: COMMERCIAL

## 2018-09-26 VITALS
WEIGHT: 205 LBS | HEIGHT: 65 IN | OXYGEN SATURATION: 100 % | DIASTOLIC BLOOD PRESSURE: 110 MMHG | TEMPERATURE: 98 F | RESPIRATION RATE: 18 BRPM | SYSTOLIC BLOOD PRESSURE: 173 MMHG | BODY MASS INDEX: 34.16 KG/M2

## 2018-09-26 DIAGNOSIS — M79.632 PAIN IN BOTH FOREARMS: ICD-10-CM

## 2018-09-26 DIAGNOSIS — M79.631 PAIN IN BOTH FOREARMS: ICD-10-CM

## 2018-09-26 DIAGNOSIS — J20.9 ACUTE BRONCHITIS, UNSPECIFIED ORGANISM: ICD-10-CM

## 2018-09-26 PROCEDURE — G0463 HOSPITAL OUTPT CLINIC VISIT: HCPCS | Mod: 25

## 2018-09-26 PROCEDURE — 99213 OFFICE O/P EST LOW 20 MIN: CPT | Performed by: PHYSICIAN ASSISTANT

## 2018-09-26 PROCEDURE — 71046 X-RAY EXAM CHEST 2 VIEWS: CPT

## 2018-09-26 RX ORDER — BENZONATATE 200 MG/1
200 CAPSULE ORAL 3 TIMES DAILY PRN
Qty: 21 CAPSULE | Refills: 0 | Status: SHIPPED | OUTPATIENT
Start: 2018-09-26 | End: 2018-12-04

## 2018-09-26 ASSESSMENT — ENCOUNTER SYMPTOMS
SHORTNESS OF BREATH: 1
GASTROINTESTINAL NEGATIVE: 1
COUGH: 1
CARDIOVASCULAR NEGATIVE: 1
FEVER: 0
RHINORRHEA: 1
NEUROLOGICAL NEGATIVE: 1
CHILLS: 0
CONSTITUTIONAL NEGATIVE: 1

## 2018-09-26 NOTE — ED AVS SNAPSHOT
[facility] Emergency Department    [address] [facility] [address]    Phone:  [phone]    Fax:  [phone]                                       Dorina Lee   MRN: [id number]    Department:  [facility] Emergency Department   Date of Visit:  9/26/2018           Patient Information     Date Of Birth          1975        Your diagnoses for this visit were:     Acute bronchitis, unspecified organism     Pain in both forearms Concern for tendonitis/overuse       You were seen by Kiya Kay PA-C.      Follow-up Information     Follow up with Clinic, [facility]. Call in 5 days.    Why:  As needed, For persistent symptoms    Contact information:    [address] [place] [id number] [phone]          Follow up with [facility] Emergency Department.    Specialty:  EMERGENCY MEDICINE    Why:  As needed, If symptoms worsen    Contact information:    [address] [place] [id number] [phone]    Additional information:    The medical center is located at   [address] [place] [address] (between [facility] and   [place] [address] in [place], four miles north   of [place]).        Discharge Instructions         Acute Bronchitis  Your healthcare provider has told you that you have acute bronchitis. Bronchitis is infection or inflammation of the bronchial tubes (airways in the lungs). Normally, air moves easily in and out of the airways. Bronchitis narrows the airways, making it harder for air to flow in and out of the lungs. This causes symptoms such as shortness of breath, coughing up yellow or green mucus, and wheezing. Bronchitis can be acute or chronic. Acute means the condition comes on quickly and goes away in a short time, usually within 3 to 10 days. Chronic means a condition lasts a long time and often comes back.    What causes acute bronchitis?  Acute bronchitis almost always starts as a viral respiratory infection, such as a cold or the flu.  Certain factors make it more likely for a cold or flu to turn into bronchitis. These include being very young, being elderly, having a heart or lung problem, or having a weak immune system. Cigarette smoking also makes bronchitis more likely.  When bronchitis develops, the airways become swollen. The airways may also become infected with bacteria. This is known as a secondary infection.  Diagnosing acute bronchitis  Your healthcare provider will examine you and ask about your symptoms and health history. You may also have a sputum culture to test the fluid in your lungs. Chest X-rays may be done to look for infection in the lungs.  Treating acute bronchitis  Bronchitis usually clears up as the cold or flu goes away. You can help feel better faster by doing the following:    Take medicine as directed. You may be told to take ibuprofen or other over-the-counter medicines. These help relieve inflammation in your bronchial tubes. Your healthcare provider may prescribe an inhaler to help open up the bronchial tubes. Most of the time, acute bronchitis is caused by a viral infection. Antibiotics are usually not prescribed for viral infections.    Drink plenty of fluids, such as water, juice, or warm soup. Fluids loosen mucus so that you can cough it up. This helps you breathe more easily. Fluids also prevent dehydration.    Make sure you get plenty of rest.    Do not smoke. Do not allow anyone else to smoke in your home.  Recovery and follow-up  Follow up with your doctor as you are told. You will likely feel better in a week or two. But a dry cough can linger beyond that time. Let your doctor know if you still have symptoms (other than a dry cough) after 2 weeks, or if you re prone to getting bronchial infections. Take steps to protect yourself from future infections. These steps include stopping smoking and avoiding tobacco smoke, washing your hands often, and getting a yearly flu shot.  When to call your healthcare  provider  Call the healthcare provider if you have any of the following:    Fever of 100.4 F (38.0 C) or higher, or as advised    Symptoms that get worse, or new symptoms    Trouble breathing    Symptoms that don t start to improve within a week, or within 3 days of taking antibiotics   Date Last Reviewed: 12/1/2016 2000-2017 [employer]. [address] [place] [address]. All rights reserved. This information is not intended as a substitute for professional medical care. Always follow your healthcare professional's instructions.          Discharge References/Attachments     LATERAL EPICONDYLITIS, UNDERSTANDING (ENGLISH)      24 Hour Appointment Hotline       To make an appointment at any [facility], call [phone] ([phone]). If you don't have a family doctor or clinic, we will help you find one. [facility] are conveniently located to serve the needs of you and your family.             Review of your medicines      START taking        Dose / Directions Last dose taken    benzonatate 200 MG capsule   Commonly known as:  TESSALON   Dose:  200 mg   Quantity:  21 capsule        Take 1 capsule (200 mg) by mouth 3 times daily as needed for cough   Refills:  0          Our records show that you are taking the medicines listed below. If these are incorrect, please call your family doctor or clinic.        Dose / Directions Last dose taken    IBUPROFEN PO   Dose:  600 mg        Take 600 mg by mouth daily as needed for moderate pain   Refills:  0                Prescriptions were sent or printed at these locations (1 Prescription)                   [facility] [address]   [address] [facility] [address]    Telephone:  [phone]   Fax:  [phone]   Hours:                  E-Prescribed (1 of 1)         benzonatate (TESSALON) 200 MG capsule                Procedures and tests performed during your visit     XR Chest 2 Views     "  Orders Needing Specimen Collection     None      Pending Results     No orders found from 2018 to 2018.            Pending Culture Results     No orders found from 2018 to 2018.            Pending Results Instructions     If you had any lab results that were not finalized at the time of your Discharge, you can call the ED Lab Result RN at [phone]. You will be contacted by this team for any positive Lab results or changes in treatment. The nurses are available 7 days a week from 10A to 6:30P.  You can leave a message 24 hours per day and they will return your call.        Test Results From Your Hospital Stay        2018  7:02 PM      Narrative     XR CHEST 2 VW 2018 6:59 PM     HISTORY: cough;         Impression     IMPRESSION: Negative exam.    ZACK CHAN MD                Thank you for choosing [facility]       Thank you for choosing [facility] for your care. Our goal is always to provide you with excellent care. Hearing back from our patients is one way we can continue to improve our services. Please take a few minutes to complete the written survey that you may receive in the mail after you visit with us. Thank you!        [employer] Information     [employer] lets you send messages to your doctor, view your test results, renew your prescriptions, schedule appointments and more. To sign up, go to [email or web address] . Click on \"Log in\" on the left side of the screen, which will take you to the Welcome page. Then click on \"Sign up Now\" on the right side of the page.     You will be asked to enter the access code listed below, as well as some personal information. Please follow the directions to create your username and password.     Your access code is: [email or web address]  Expires: 2018  7:23 PM     Your access code will  in 90 days. If you need help or a new code, please call your [facility] clinic or [phone].        Care EveryWhere ID     This is your Care EveryWhere " ID. This could be used by other organizations to access your [place] [place] medical records  [id number]        Equal Access to Services     KRASON CHUNG : [place], esteban galvan, leonard marcum. So [facility] [phone].    ATENCIÓN: Si habla español, tiene a toledo disposición servicios gratuitos de asistencia lingüística. Llame al [phone].    We comply with applicable federal civil rights laws and [place] laws. We do not discriminate on the basis of race, color, national origin, age, disability, sex, sexual orientation, or gender identity.            After Visit Summary       This is your record. Keep this with you and show to your community pharmacist(s) and doctor(s) at your next visit.

## 2018-09-26 NOTE — ED AVS SNAPSHOT
[facility] Emergency Department    [address] [facility] [address]    Phone:  [phone]    Fax:  [phone]                                       Dorina Lee   MRN: [id number]    Department:  [facility] Emergency Department   Date of Visit:  9/26/2018           After Visit Summary Signature Page     I have received my discharge instructions, and my questions have been answered. I have discussed any challenges I see with this plan with the nurse or doctor.    ..........................................................................................................................................  Patient/Patient Representative Signature      ..........................................................................................................................................  Patient Representative Print Name and Relationship to Patient    ..................................................               ................................................  Date                                   Time    ..........................................................................................................................................  Reviewed by Signature/Title    ...................................................              ..............................................  Date                                               Time          22EPIC Rev 08/18

## 2018-09-26 NOTE — ED PROVIDER NOTES
History     Chief Complaint   Patient presents with     URI     cough and arm pain     HPI  Dorina Lee is a 43 year old female who presents with complaints of persistent cough over the past 6 weeks.  Patient states she initially had sinus symptoms including nasal congestion and rhinorrhea over this is since resolved.  Patient states she thought she was getting better however over the past 4-5 days, her cough has returned once again.  She has some associated shortness of breath and chest tightness with the cough.  Denies fevers, chills, sore throat, sinus pressure, nasal congestion, rash, or neck pain/stiffness.  Patient denies history of asthma or underlying lung disease.  She is a former smoker.  She has tried over-the-counter cough medications at nighttime with minimal improvement.    Patient also complains of bilateral forearm pain.  She states her right forearm has been bothering her for quite some time.  Her left arm started to hurt while at work today.  Patient reports increasing pain with certain movements of her forearm.  She has a job that involves repetitive movements of her arms while at [employer].      Problem List:    Patient Active Problem List    Diagnosis Date Noted     Routine general medical examination at a health care facility 10/30/2013     Priority: Medium     [facility] outpatient clinic pap and mammogram  2013       External bleeding hemorrhoids 2008     Priority: Medium        Past Medical History:    History reviewed. No pertinent past medical history.    Past Surgical History:    Past Surgical History:   Procedure Laterality Date     TONSILLECTOMY         Family History:    Family History   Problem Relation Age of Onset     Neurologic Disorder Mother      Murray's  at 41     Neurologic Disorder Maternal Grandmother      desire's      Cancer Father      Unknown/Adopted Other        Social History:  Marital Status:   [2]  Social History  "  Substance Use Topics     Smoking status: Former Smoker     Packs/day: 0.50     Quit date: 1/1/2007     Smokeless tobacco: Never Used      Comment: smoked for 10 years     Alcohol use Yes      Comment: one drink every 6 months        Medications:      benzonatate (TESSALON) 200 MG capsule   IBUPROFEN PO         Review of Systems   Constitutional: Negative.  Negative for chills and fever.   HENT: Positive for congestion and rhinorrhea.    Respiratory: Positive for cough and shortness of breath.    Cardiovascular: Negative.    Gastrointestinal: Negative.    Musculoskeletal:        Bilateral forearm pain   Skin: Negative.    Neurological: Negative.    All other systems reviewed and are negative.      Physical Exam   BP: (!) 173/110  Heart Rate: 79  Temp: 98  F (36.7  C)  Resp: 18  Height: 165.1 cm (5' 5\")  Weight: 93 kg (205 lb)  SpO2: 100 %      Physical Exam   Constitutional: She is oriented to person, place, and time. She appears well-developed and well-nourished.  Non-toxic appearance. No distress.   HENT:   Head: Normocephalic and atraumatic.   Right Ear: Tympanic membrane, external ear and ear canal normal.   Left Ear: Tympanic membrane, external ear and ear canal normal.   Nose: Mucosal edema present.   Mouth/Throat: Uvula is midline, oropharynx is clear and moist and mucous membranes are normal. No uvula swelling. No oropharyngeal exudate, posterior oropharyngeal edema, posterior oropharyngeal erythema or tonsillar abscesses.   Eyes: Conjunctivae and EOM are normal. Pupils are equal, round, and reactive to light.   Neck: Normal range of motion, full passive range of motion without pain and phonation normal. Neck supple. No spinous process tenderness and no muscular tenderness present. No rigidity. No erythema and normal range of motion present.   Cardiovascular: Normal rate, regular rhythm and normal heart sounds.    Pulmonary/Chest: Effort normal and breath sounds normal. No respiratory distress. She has no " decreased breath sounds. She has no wheezes. She has no rhonchi. She has no rales.   Musculoskeletal: Normal range of motion. She exhibits no edema.        Right elbow: She exhibits normal range of motion, no swelling and no effusion. Tenderness found. Lateral epicondyle tenderness noted.        Left elbow: She exhibits normal range of motion, no swelling and no effusion. Tenderness found. Lateral epicondyle tenderness noted.        Right forearm: She exhibits tenderness. She exhibits no bony tenderness, no swelling and no edema.        Left forearm: She exhibits tenderness. She exhibits no bony tenderness, no swelling and no edema.   Tenderness to lateral epicondyles bilaterally.  Patient also has tenderness along forearm muscles bilaterally.  No overlying skin changes or limited range of motion.  No associated swelling.   Lymphadenopathy:     She has no cervical adenopathy.   Neurological: She is alert and oriented to person, place, and time.   Skin: Skin is warm and dry. No rash noted.       ED Course     ED Course     Procedures    Results for orders placed or performed during the hospital encounter of 09/26/18 (from the past 24 hour(s))   XR Chest 2 Views    Narrative    XR CHEST 2 VW 9/26/2018 6:59 PM     HISTORY: cough;       Impression    IMPRESSION: Negative exam.    ZACK CHAN MD       Medications - No data to display    Assessments & Plan (with Medical Decision Making)     Pt is a 43 year old female who presents with complaints of persistent cough over the past 6 weeks.  Patient states she initially had sinus symptoms including nasal congestion and rhinorrhea over this is since resolved.  Patient states she thought she was getting better however over the past 4-5 days, her cough has returned once again.  She has some associated shortness of breath and chest tightness with the cough.  She is a former smoker.  Pt is afebrile on arrival.  Exam as above.  Chest x-ray was negative for pneumonia or acute  pathology.  Discussed results with patient.      Patient also complains of bilateral forearm pain.  She states her right forearm has been bothering her for quite some time.  Her left arm started to hurt while at work today.  Patient reports increasing pain with certain movements of her forearm.  She has a job that involves repetitive movements of her arms while at [employer].  Exam as above.  Suspect lateral epicondylitis and tendinitis/overuse as a contributory cause to patient's symptoms.  Encouraged symptomatic treatment of this at home including rest, ice, and anti-inflammatories as tolerated.    Return precautions were reviewed.  Hand-outs were provided.    Patient was sent with Tessalon (patient is declining steroids or inhaler at this time) and was instructed to follow-up with PCP if no improvement in 5-7 days for continued care and management or sooner if new or worsening symptoms.  She is to return to the ED for persistent and/or worsening symptoms.  Patient expressed understanding of the diagnosis and plan and was discharged home in good condition.    I have reviewed the nursing notes.    I have reviewed the findings, diagnosis, plan and need for follow up with the patient.    Discharge Medication List as of 9/26/2018  7:24 PM      START taking these medications    Details   benzonatate (TESSALON) 200 MG capsule Take 1 capsule (200 mg) by mouth 3 times daily as needed for cough, Disp-21 capsule, R-0, E-Prescribe             Final diagnoses:   Acute bronchitis, unspecified organism   Pain in both forearms - Concern for tendonitis/overuse       9/26/2018   [facility] EMERGENCY DEPARTMENT      Disclaimer:  This note consists of symbols derived from keyboarding, dictation and/or voice recognition software.  As a result, there may be errors in the script that have gone undetected.  Please consider this when interpreting information found in this chart.     Kiya Kay PA-C  09/26/18 [id number]

## 2018-09-27 NOTE — DISCHARGE INSTRUCTIONS

## 2018-12-04 ENCOUNTER — OFFICE VISIT (OUTPATIENT)
Dept: FAMILY MEDICINE | Facility: CLINIC | Age: 43
End: 2018-12-04
Payer: COMMERCIAL

## 2018-12-04 VITALS
BODY MASS INDEX: 33.11 KG/M2 | RESPIRATION RATE: 16 BRPM | DIASTOLIC BLOOD PRESSURE: 90 MMHG | SYSTOLIC BLOOD PRESSURE: 122 MMHG | HEART RATE: 73 BPM | WEIGHT: 206 LBS | OXYGEN SATURATION: 99 % | HEIGHT: 66 IN | TEMPERATURE: 97.2 F

## 2018-12-04 DIAGNOSIS — H10.9 BACTERIAL CONJUNCTIVITIS OF LEFT EYE: Primary | ICD-10-CM

## 2018-12-04 PROCEDURE — 99213 OFFICE O/P EST LOW 20 MIN: CPT | Performed by: NURSE PRACTITIONER

## 2018-12-04 RX ORDER — POLYMYXIN B SULFATE AND TRIMETHOPRIM 1; 10000 MG/ML; [USP'U]/ML
1 SOLUTION OPHTHALMIC 4 TIMES DAILY
Qty: 2 ML | Refills: 0 | Status: SHIPPED | OUTPATIENT
Start: 2018-12-04 | End: 2019-12-17

## 2018-12-04 NOTE — LETTER
[facility]  [address] [facility] [address]  Phone: [phone]    December 4, 2018        Dorina Lee  [address] [place] [id number]          To whom it may concern:    RE: Dorina Lee    Patient was seen and treated today at our clinic and missed work today 12/4/18.    Please contact me for questions or concerns.      Sincerely,        Sophie Mock NP

## 2018-12-04 NOTE — PATIENT INSTRUCTIONS
1.  Use warm wash clot to wash the mattering.  2.  Try not to touch the other eye, it will spread because it is contagious.  3.  Use drops as directed.  4.  Follow-up in clinic if any persistent symptoms.  5.  If any visual changes, make appointment with ophthalmology.

## 2018-12-04 NOTE — MR AVS SNAPSHOT
"              After Visit Summary   12/4/2018    Dorina Lee    MRN: [id number]           Patient Information     Date Of Birth          1975        Visit Information        Provider Department      12/4/2018 10:20 AM Sophie Mock NP [facility]        Today's Diagnoses     Bacterial conjunctivitis of left eye    -  1      Care Instructions    1.  Use warm wash clot to wash the mattering.  2.  Try not to touch the other eye, it will spread because it is contagious.  3.  Use drops as directed.  4.  Follow-up in clinic if any persistent symptoms.  5.  If any visual changes, make appointment with ophthalmology.              Follow-ups after your visit        Follow-up notes from your care team     Return in about 1 week (around 12/11/2018), or if symptoms worsen or fail to improve.      Who to contact     If you have questions or need follow up information about today's clinic visit or your schedule please contact [facility] directly at [phone].  Normal or non-critical lab and imaging results will be communicated to you by [employer], letter or phone within 4 business days after the clinic has received the results. If you do not hear from us within 7 days, please contact the clinic through [employer] or phone. If you have a critical or abnormal lab result, we will notify you by phone as soon as possible.  Submit refill requests through [employer] or call your pharmacy and they will forward the refill request to us. Please allow 3 business days for your refill to be completed.          Additional Information About Your Visit        Care EveryWhere ID     This is your Care EveryWhere ID. This could be used by other organizations to access your [place] medical records  [id number]        Your Vitals Were     Pulse Temperature Respirations Height Pulse Oximetry BMI (Body Mass Index)    73 97.2  F (36.2  C) (Tympanic) 16 5' 5.5\" (1.664 m) 99% 33.76 kg/m2       Blood " Pressure from Last 3 Encounters:   12/04/18 122/90   09/26/18 (!) 173/110   07/06/17 (!) 141/95    Weight from Last 3 Encounters:   12/04/18 206 lb (93.4 kg)   09/26/18 205 lb (93 kg)   07/06/17 198 lb (89.8 kg)              Today, you had the following     No orders found for display         Today's Medication Changes          These changes are accurate as of 12/4/18 10:48 AM.  If you have any questions, ask your nurse or doctor.               Start taking these medicines.        Dose/Directions    trimethoprim-polymyxin b 92286-4.1 UNIT/ML-% ophthalmic solution   Commonly known as:  POLYTRIM   Used for:  Bacterial conjunctivitis of left eye   Started by:  Sophie Mock, NP        Dose:  1 drop   Place 1 drop Into the left eye 4 times daily For 5-7 days   Quantity:  2 mL   Refills:  0            Where to get your medicines      These medications were sent to [facility] [address] [place] [address]  [address] [place] [address]     Phone:  [phone]     trimethoprim-polymyxin b 25093-7.1 UNIT/ML-% ophthalmic solution                Primary Care Provider Office Phone # Fax #    [facility] [phone]       [facility] [address]        Equal Access to Services     KARSON CHUNG : Hadii timmy ku hadasho Soomaali, waaxda luqadaha, qaybta kaalmada adeegyada, leonard freed. So [facility] [phone].    ATENCIÓN: Si habla español, tiene a toledo disposición servicios gratuitos de asistencia lingüística. Blayne allison [phone].    We comply with applicable federal civil rights laws and [place] laws. We do not discriminate on the basis of race, color, national origin, age, disability, sex, sexual orientation, or gender identity.            Thank you!     Thank you for choosing [facility]  for your care. Our goal is always to provide you with excellent care. Hearing back from our patients is one way we can  continue to improve our services. Please take a few minutes to complete the written survey that you may receive in the mail after your visit with us. Thank you!             Your Updated Medication List - Protect others around you: Learn how to safely use, store and throw away your medicines at www.disposemymeds.org.          This list is accurate as of 12/4/18 10:48 AM.  Always use your most recent med list.                   Brand Name Dispense Instructions for use Diagnosis    IBUPROFEN PO      Take 600 mg by mouth daily as needed for moderate pain        trimethoprim-polymyxin b 12352-8.1 UNIT/ML-% ophthalmic solution    POLYTRIM    2 mL    Place 1 drop Into the left eye 4 times daily For 5-7 days    Bacterial conjunctivitis of left eye

## 2018-12-04 NOTE — PROGRESS NOTES
SUBJECTIVE:   Dorina Lee is a 43 year old female who presents to clinic today for the following health issues:      Eye(s) Problem  Onset: This AM    Description: Pt woke up this AM with a red, itchy eye with discharge.  Location: left  Pain: no  Redness: YES    Accompanying Signs & Symptoms:  Discharge/mattering: YES, mattered shut this am  Swelling: YES  Visual changes: NO  Fever: no   Nasal Congestion: no  Bothered by bright lights: no  Feels itchy    History:   Trauma: no   Foreign body exposure: no     Precipitating factors:   Wearing contacts: no     Alleviating factors:  Improved by: None    Therapies Tried and outcome: None    Grandson is sick and she has been taking care of him.  No current congestion or illness.    Problem list and histories reviewed & adjusted, as indicated.  Additional history: as documented    Patient Active Problem List   Diagnosis     External bleeding hemorrhoids     Routine general medical examination at a health care facility     Past Surgical History:   Procedure Laterality Date     TONSILLECTOMY         Social History   Substance Use Topics     Smoking status: Former Smoker     Packs/day: 0.50     Quit date: 2007     Smokeless tobacco: Never Used      Comment: smoked for 10 years     Alcohol use Yes      Comment: one drink every 6 months     Family History   Problem Relation Age of Onset     Neurologic Disorder Mother      [place]'s  at 41     Neurologic Disorder Maternal Grandmother      desire's      Cancer Father      Unknown/Adopted Other          Current Outpatient Prescriptions   Medication Sig Dispense Refill     IBUPROFEN PO Take 600 mg by mouth daily as needed for moderate pain       trimethoprim-polymyxin b (POLYTRIM) 30545-1.1 UNIT/ML-% ophthalmic solution Place 1 drop Into the left eye 4 times daily For 5-7 days 2 mL 0     No Known Allergies    Reviewed and updated as needed this visit by clinical staff  Tobacco  Allergies  Meds  " Problems       Reviewed and updated as needed this visit by Provider  Allergies  Meds  Problems         ROS:  CONSTITUTIONAL: NEGATIVE for fever, chills, change in weight  EYES: POSITIVE for discharge left, mattering left and redness left and NEGATIVE for vision changes or feeling that there is something in the eye  ENT/MOUTH: NEGATIVE for ear, mouth and throat problems  RESP: NEGATIVE for significant cough or SOB  CV: NEGATIVE for chest pain, palpitations or peripheral edema  PSYCHIATRIC: NEGATIVE for changes in mood or affect  ROS otherwise negative    OBJECTIVE:     /90  Pulse 73  Temp 97.2  F (36.2  C) (Tympanic)  Resp 16  Ht 5' 5.5\" (1.664 m)  Wt 206 lb (93.4 kg)  SpO2 99%  BMI 33.76 kg/m2  Body mass index is 33.76 kg/(m^2).  GENERAL: healthy, alert and no distress  EYES: PERRL, visual fields normal and conjunctiva/corneas- conjunctival injection OS  HENT: ear canals and TM's normal, nose and mouth without ulcers or lesions  NECK: no adenopathy and no asymmetry, masses, or scars  RESP: lungs clear to auscultation - no rales, rhonchi or wheezes  CV: regular rate and rhythm, normal S1 S2, no S3 or S4, no murmur, click or rub, no peripheral edema and peripheral pulses strong  PSYCH: mentation appears normal, affect normal/bright    Diagnostic Test Results:  none     ASSESSMENT/PLAN:     1. Bacterial conjunctivitis of left eye  Due to matting of the eye, most likely bacterial conjunctivitis.  Treating with antibiotic eyedrops for 5-7 days.  Patient recommended to follow-up in clinic any persistent symptoms.  Also recommended to see ophthalmology if any vision changes.  - trimethoprim-polymyxin b (POLYTRIM) 07716-0.1 UNIT/ML-% ophthalmic solution; Place 1 drop Into the left eye 4 times daily For 5-7 days  Dispense: 2 mL; Refill: 0    See Patient Instructions    Sophie Mock NP  [facility]  "

## 2019-01-23 ENCOUNTER — HOSPITAL ENCOUNTER (EMERGENCY)
Facility: CLINIC | Age: 44
Discharge: HOME | End: 2019-01-23
Attending: NURSE PRACTITIONER | Admitting: NURSE PRACTITIONER
Payer: COMMERCIAL

## 2019-01-23 VITALS
BODY MASS INDEX: 34.16 KG/M2 | DIASTOLIC BLOOD PRESSURE: 120 MMHG | TEMPERATURE: 98.5 F | OXYGEN SATURATION: 100 % | WEIGHT: 205 LBS | HEIGHT: 65 IN | SYSTOLIC BLOOD PRESSURE: 164 MMHG | RESPIRATION RATE: 16 BRPM

## 2019-01-23 DIAGNOSIS — K04.7 DENTAL INFECTION: ICD-10-CM

## 2019-01-23 PROCEDURE — 99214 OFFICE O/P EST MOD 30 MIN: CPT | Mod: Z6 | Performed by: NURSE PRACTITIONER

## 2019-01-23 PROCEDURE — G0463 HOSPITAL OUTPT CLINIC VISIT: HCPCS | Performed by: NURSE PRACTITIONER

## 2019-01-23 RX ORDER — PENICILLIN V POTASSIUM 500 MG/1
500 TABLET, FILM COATED ORAL 4 TIMES DAILY
Qty: 40 TABLET | Refills: 0 | Status: SHIPPED | OUTPATIENT
Start: 2019-01-23 | End: 2019-02-02

## 2019-01-23 ASSESSMENT — ENCOUNTER SYMPTOMS
ABDOMINAL PAIN: 0
FACIAL SWELLING: 1
SINUS PRESSURE: 1
APPETITE CHANGE: 0
RHINORRHEA: 0
CHILLS: 0
HEADACHES: 0
VOMITING: 0
SORE THROAT: 0
COUGH: 0
FATIGUE: 0
FEVER: 0

## 2019-01-23 ASSESSMENT — MIFFLIN-ST. JEOR: SCORE: 1575.75

## 2019-01-23 NOTE — ED AVS SNAPSHOT
[facility] Emergency Department  [address] [address]  Phone:  [phone]  Fax:  [phone]                                    Dorina Lee   MRN: [id number]    Department:  [facility] Emergency Department   Date of Visit:  1/23/2019           After Visit Summary Signature Page    I have received my discharge instructions, and my questions have been answered. I have discussed any challenges I see with this plan with the nurse or doctor.    ..........................................................................................................................................  Patient/Patient Representative Signature      ..........................................................................................................................................  Patient Representative Print Name and Relationship to Patient    ..................................................               ................................................  Date                                   Time    ..........................................................................................................................................  Reviewed by Signature/Title    ...................................................              ..............................................  Date                                               Time          22EPIC Rev 08/18

## 2019-01-24 NOTE — ED PROVIDER NOTES
History     Chief Complaint   Patient presents with     Dental Problem     had tooth pain two days ago and now having facial swelling and no tooth pain     HPI  Dorina Lee is a 43 year old female who presents to urgent care for evaluation of possible dental infection. Patient reports pain 2 days ago over tooth #13 which improved with Ibuprofen. Today she woke with right facial swelling. Pain is resolved. Denies fever or chills. Denies feeling ill. Denies nausea or vomiting.    Allergies:  No Known Allergies    Problem List:    Patient Active Problem List    Diagnosis Date Noted     Routine general medical examination at a [facility] care facility 10/30/2013     Priority: Medium     [facility] outpatient clinic pap and mammogram  2013       External bleeding hemorrhoids 2008     Priority: Medium        Past Medical History:    No past medical history on file.    Past Surgical History:    Past Surgical History:   Procedure Laterality Date     TONSILLECTOMY         Family History:    Family History   Problem Relation Age of Onset     Neurologic Disorder Mother         [place]'s  at 41     Neurologic Disorder Maternal Grandmother         desire's      Cancer Father      Unknown/Adopted Other        Social History:  Marital Status:   [2]  Social History     Tobacco Use     Smoking status: Former Smoker     Packs/day: 0.50     Last attempt to quit: 2007     Years since quittin.0     Smokeless tobacco: Never Used     Tobacco comment: smoked for 10 years   Substance Use Topics     Alcohol use: Yes     Comment: one drink every 6 months     Drug use: No        Medications:      penicillin V (VEETID) 500 MG tablet   IBUPROFEN PO   trimethoprim-polymyxin b (POLYTRIM) 74252-6.1 UNIT/ML-% ophthalmic solution         Review of Systems   Constitutional: Negative for appetite change, chills, fatigue and fever.   HENT: Positive for dental problem, facial swelling and sinus  "pressure (left). Negative for congestion, ear pain, rhinorrhea and sore throat.    Respiratory: Negative for cough.    Cardiovascular: Negative for chest pain.   Gastrointestinal: Negative for abdominal pain and vomiting.   Neurological: Negative for headaches.       Physical Exam   BP: (!) 164/120  Heart Rate: 82  Temp: 98.5  F (36.9  C)  Resp: 16  Height: 165.1 cm (5' 5\")  Weight: 93 kg (205 lb)  SpO2: 100 %      Physical Exam  General: healthy, alert and no distress  ENT: ENT exam normal, no neck nodes. Left sinus mildly tender  Mouth: facial swelling is Present, No trismus.   tenderness to touch at tooth: none   Teeth carious:no    Teeth broken: no   Visible or palpable abscess: no         ED Course        Procedures         No results found for this or any previous visit (from the past 24 hour(s)).    Medications - No data to display    Assessments & Plan (with Medical Decision Making)   Strong suspicion for dental abscess developing at the root of tooth #13 given her pain 2 days ago and now with facial swelling. Patient appears well. Afebrile. Left facial swelling present, no trismus. No significant dental carries. No obvious palpable or visual abscess that needs I & D. Recommend follow-up with dentist. Rx for penicillin provided.  Worrisome reasons to return discussed including fever, increased swelling, redness, vomiting, feeling ill or worse in any way.   I have reviewed the nursing notes.    I have reviewed the findings, diagnosis, plan and need for follow up with the patient.         Medication List      Started    penicillin V 500 MG tablet  Commonly known as:  VEETID  500 mg, Oral, 4 TIMES DAILY            Final diagnoses:   Dental infection       1/23/2019   [facility] EMERGENCY DEPARTMENT     Kera Huber APRN CNP  01/23/19 1819    "

## 2019-12-17 ENCOUNTER — OFFICE VISIT (OUTPATIENT)
Dept: FAMILY MEDICINE | Facility: CLINIC | Age: 44
End: 2019-12-17
Payer: COMMERCIAL

## 2019-12-17 VITALS
RESPIRATION RATE: 16 BRPM | HEIGHT: 65 IN | OXYGEN SATURATION: 98 % | WEIGHT: 215.6 LBS | DIASTOLIC BLOOD PRESSURE: 86 MMHG | SYSTOLIC BLOOD PRESSURE: 136 MMHG | TEMPERATURE: 98.1 F | BODY MASS INDEX: 35.92 KG/M2 | HEART RATE: 83 BPM

## 2019-12-17 DIAGNOSIS — R21 RASH: Primary | ICD-10-CM

## 2019-12-17 PROCEDURE — 99213 OFFICE O/P EST LOW 20 MIN: CPT | Performed by: NURSE PRACTITIONER

## 2019-12-17 RX ORDER — CLOTRIMAZOLE AND BETAMETHASONE DIPROPIONATE 10; .64 MG/G; MG/G
CREAM TOPICAL 2 TIMES DAILY
Qty: 45 G | Refills: 1 | Status: SHIPPED | OUTPATIENT
Start: 2019-12-17 | End: 2019-12-31

## 2019-12-17 ASSESSMENT — MIFFLIN-ST. JEOR: SCORE: 1618.84

## 2019-12-17 NOTE — PROGRESS NOTES
"Subjective     Dorina Lee is a 44 year old female who presents to clinic today for the following health issues:  Chief Complaint   Patient presents with     Lesion     lesions on top inside of  both upper thighs      Health Maintenance     reminded due for physical/pappe,mammogram - gave info , declined flu shot, will get TD at physical apt .          Concern - lesions on top inside of  both upper thighs   Onset: x 1 year     Description:   Lesion on both upper inner thighs     Intensity: mild to moderate    Progression of Symptoms:  waxing and waning    Accompanying Signs & Symptoms:  Redness, pain  Itching   Denies any open areas or weeping.    Previous history of similar problem:   None     Precipitating factors:   Worsened by: wearing jeans, showering every day     Alleviating factors:  Improved by: keeping area dry     Therapies Tried and outcome: anti biotic ointment - no relief       Reviewed and updated as needed this visit by Provider         Review of Systems   ROS COMP: Constitutional, HEENT, cardiovascular, pulmonary, gi and gu systems are negative, except as otherwise noted.      Objective    /86   Pulse 83   Temp 98.1  F (36.7  C) (Tympanic)   Resp 16   Ht 1.651 m (5' 5\")   Wt 97.8 kg (215 lb 9.6 oz)   SpO2 98%   BMI 35.88 kg/m    Body mass index is 35.88 kg/m .  Physical Exam   GENERAL: healthy, alert and no distress  SKIN: erythematous patches on upper inner thighs            Assessment & Plan       ICD-10-CM    1. Rash R21 clotrimazole-betamethasone (LOTRISONE) 1-0.05 % external cream     Fungal vs inflammatory  Lotrisone BID for up to 14 days.  Follow up if no improvement.      Return in about 2 weeks (around 12/31/2019).    The risks, benefits and treatment options of prescribed medications or other treatments have been discussed with the patient. The patient verbalized their understanding and should call or follow up if no improvement or if they develop further " problems.    NEDRA Tate [facility]

## 2020-02-18 ENCOUNTER — HOSPITAL ENCOUNTER (EMERGENCY)
Facility: CLINIC | Age: 45
Discharge: HOME | End: 2020-02-18
Attending: EMERGENCY MEDICINE | Admitting: EMERGENCY MEDICINE
Payer: COMMERCIAL

## 2020-02-18 ENCOUNTER — APPOINTMENT (OUTPATIENT)
Dept: CT IMAGING | Facility: CLINIC | Age: 45
End: 2020-02-18
Attending: EMERGENCY MEDICINE
Payer: COMMERCIAL

## 2020-02-18 ENCOUNTER — APPOINTMENT (OUTPATIENT)
Dept: GENERAL RADIOLOGY | Facility: CLINIC | Age: 45
End: 2020-02-18
Attending: EMERGENCY MEDICINE
Payer: COMMERCIAL

## 2020-02-18 VITALS
BODY MASS INDEX: 34.99 KG/M2 | HEIGHT: 65 IN | DIASTOLIC BLOOD PRESSURE: 105 MMHG | SYSTOLIC BLOOD PRESSURE: 169 MMHG | HEART RATE: 81 BPM | OXYGEN SATURATION: 96 % | WEIGHT: 210 LBS | TEMPERATURE: 98.5 F | RESPIRATION RATE: 18 BRPM

## 2020-02-18 DIAGNOSIS — J11.1 INFLUENZA-LIKE ILLNESS: ICD-10-CM

## 2020-02-18 DIAGNOSIS — J20.9 ACUTE BRONCHITIS, UNSPECIFIED ORGANISM: ICD-10-CM

## 2020-02-18 LAB
ALBUMIN SERPL-MCNC: 3.8 G/DL (ref 3.4–5)
ALP SERPL-CCNC: 78 U/L (ref 40–150)
ALT SERPL W P-5'-P-CCNC: 24 U/L (ref 0–50)
ANION GAP SERPL CALCULATED.3IONS-SCNC: 6 MMOL/L (ref 3–14)
AST SERPL W P-5'-P-CCNC: 23 U/L (ref 0–45)
BASOPHILS # BLD AUTO: 0 10E9/L (ref 0–0.2)
BASOPHILS NFR BLD AUTO: 0.4 %
BILIRUB SERPL-MCNC: 0.3 MG/DL (ref 0.2–1.3)
BUN SERPL-MCNC: 13 MG/DL (ref 7–30)
CALCIUM SERPL-MCNC: 8.8 MG/DL (ref 8.5–10.1)
CHLORIDE SERPL-SCNC: 108 MMOL/L (ref 94–109)
CO2 SERPL-SCNC: 26 MMOL/L (ref 20–32)
CREAT SERPL-MCNC: 0.68 MG/DL (ref 0.52–1.04)
D DIMER PPP FEU-MCNC: 0.7 UG/ML FEU (ref 0–0.5)
DIFFERENTIAL METHOD BLD: NORMAL
EOSINOPHIL # BLD AUTO: 0 10E9/L (ref 0–0.7)
EOSINOPHIL NFR BLD AUTO: 0.6 %
ERYTHROCYTE [DISTWIDTH] IN BLOOD BY AUTOMATED COUNT: 13.1 % (ref 10–15)
FLUAV+FLUBV AG SPEC QL: NEGATIVE
FLUAV+FLUBV AG SPEC QL: NEGATIVE
GFR SERPL CREATININE-BSD FRML MDRD: >90 ML/MIN/{1.73_M2}
GLUCOSE SERPL-MCNC: 82 MG/DL (ref 70–99)
HCT VFR BLD AUTO: 43.9 % (ref 35–47)
HGB BLD-MCNC: 14.6 G/DL (ref 11.7–15.7)
IMM GRANULOCYTES # BLD: 0 10E9/L (ref 0–0.4)
IMM GRANULOCYTES NFR BLD: 0.2 %
LACTATE BLD-SCNC: 1.3 MMOL/L (ref 0.7–2)
LYMPHOCYTES # BLD AUTO: 1.5 10E9/L (ref 0.8–5.3)
LYMPHOCYTES NFR BLD AUTO: 28.8 %
MCH RBC QN AUTO: 29.3 PG (ref 26.5–33)
MCHC RBC AUTO-ENTMCNC: 33.3 G/DL (ref 31.5–36.5)
MCV RBC AUTO: 88 FL (ref 78–100)
MONOCYTES # BLD AUTO: 0.4 10E9/L (ref 0–1.3)
MONOCYTES NFR BLD AUTO: 6.9 %
NEUTROPHILS # BLD AUTO: 3.3 10E9/L (ref 1.6–8.3)
NEUTROPHILS NFR BLD AUTO: 63.1 %
NRBC # BLD AUTO: 0 10*3/UL
NRBC BLD AUTO-RTO: 0 /100
NT-PROBNP SERPL-MCNC: 17 PG/ML (ref 0–450)
PLATELET # BLD AUTO: 199 10E9/L (ref 150–450)
POTASSIUM SERPL-SCNC: 3.6 MMOL/L (ref 3.4–5.3)
PROT SERPL-MCNC: 7.9 G/DL (ref 6.8–8.8)
RBC # BLD AUTO: 4.98 10E12/L (ref 3.8–5.2)
SODIUM SERPL-SCNC: 140 MMOL/L (ref 133–144)
SPECIMEN SOURCE: NORMAL
TROPONIN I SERPL-MCNC: <0.015 UG/L (ref 0–0.04)
WBC # BLD AUTO: 5.2 10E9/L (ref 4–11)

## 2020-02-18 PROCEDURE — 25000128 H RX IP 250 OP 636: Performed by: EMERGENCY MEDICINE

## 2020-02-18 PROCEDURE — 93010 ELECTROCARDIOGRAM REPORT: CPT | Mod: Z6 | Performed by: EMERGENCY MEDICINE

## 2020-02-18 PROCEDURE — 83605 ASSAY OF LACTIC ACID: CPT | Performed by: EMERGENCY MEDICINE

## 2020-02-18 PROCEDURE — 99285 EMERGENCY DEPT VISIT HI MDM: CPT | Mod: 25 | Performed by: EMERGENCY MEDICINE

## 2020-02-18 PROCEDURE — 71275 CT ANGIOGRAPHY CHEST: CPT

## 2020-02-18 PROCEDURE — 83880 ASSAY OF NATRIURETIC PEPTIDE: CPT | Performed by: EMERGENCY MEDICINE

## 2020-02-18 PROCEDURE — 25800030 ZZH RX IP 258 OP 636: Performed by: EMERGENCY MEDICINE

## 2020-02-18 PROCEDURE — 87804 INFLUENZA ASSAY W/OPTIC: CPT | Performed by: EMERGENCY MEDICINE

## 2020-02-18 PROCEDURE — 80053 COMPREHEN METABOLIC PANEL: CPT | Performed by: EMERGENCY MEDICINE

## 2020-02-18 PROCEDURE — 96361 HYDRATE IV INFUSION ADD-ON: CPT | Performed by: EMERGENCY MEDICINE

## 2020-02-18 PROCEDURE — 96360 HYDRATION IV INFUSION INIT: CPT | Mod: 59 | Performed by: EMERGENCY MEDICINE

## 2020-02-18 PROCEDURE — 85379 FIBRIN DEGRADATION QUANT: CPT | Performed by: EMERGENCY MEDICINE

## 2020-02-18 PROCEDURE — 25000125 ZZHC RX 250: Performed by: EMERGENCY MEDICINE

## 2020-02-18 PROCEDURE — 85025 COMPLETE CBC W/AUTO DIFF WBC: CPT | Performed by: EMERGENCY MEDICINE

## 2020-02-18 PROCEDURE — 84484 ASSAY OF TROPONIN QUANT: CPT | Performed by: EMERGENCY MEDICINE

## 2020-02-18 PROCEDURE — 71046 X-RAY EXAM CHEST 2 VIEWS: CPT

## 2020-02-18 PROCEDURE — 93005 ELECTROCARDIOGRAM TRACING: CPT | Performed by: EMERGENCY MEDICINE

## 2020-02-18 RX ORDER — IOPAMIDOL 755 MG/ML
86 INJECTION, SOLUTION INTRAVASCULAR ONCE
Status: COMPLETED | OUTPATIENT
Start: 2020-02-18 | End: 2020-02-18

## 2020-02-18 RX ORDER — DOXYCYCLINE 100 MG/1
100 CAPSULE ORAL 2 TIMES DAILY
Qty: 20 CAPSULE | Refills: 0 | Status: SHIPPED | OUTPATIENT
Start: 2020-02-18 | End: 2020-02-28

## 2020-02-18 RX ADMIN — SODIUM CHLORIDE 1000 ML: 9 INJECTION, SOLUTION INTRAVENOUS at 21:19

## 2020-02-18 RX ADMIN — IOPAMIDOL 86 ML: 755 INJECTION, SOLUTION INTRAVENOUS at 22:12

## 2020-02-18 RX ADMIN — SODIUM CHLORIDE 100 ML: 9 INJECTION, SOLUTION INTRAVENOUS at 22:12

## 2020-02-18 ASSESSMENT — MIFFLIN-ST. JEOR: SCORE: 1593.43

## 2020-02-18 NOTE — ED AVS SNAPSHOT
[facility] Emergency Department  [address] [address]  Phone:  [phone]  Fax:  [phone]                                    Dorina Lee   MRN: [id number]    Department:  [facility] Emergency Department   Date of Visit:  2/18/2020           After Visit Summary Signature Page    I have received my discharge instructions, and my questions have been answered. I have discussed any challenges I see with this plan with the nurse or doctor.    ..........................................................................................................................................  Patient/Patient Representative Signature      ..........................................................................................................................................  Patient Representative Print Name and Relationship to Patient    ..................................................               ................................................  Date                                   Time    ..........................................................................................................................................  Reviewed by Signature/Title    ...................................................              ..............................................  Date                                               Time          22EPIC Rev 08/18

## 2020-02-18 NOTE — LETTER
February 18, 2020      To Whom It May Concern:      Dorina Lee was seen in our Emergency Department today, Tuesday 02/18/20.  I expect her condition to improve over the next several days.  She may return to work/school when improved.  Please excuse her from work as needed this week due to illness.    Sincerely,        KAREEM Alan MD

## 2020-02-19 NOTE — ED PROVIDER NOTES
History     Chief Complaint   Patient presents with     Shortness of Breath     cough      Dizziness     feels like she is going to pass out      Fatigue     chest heaviness     HPI  Dorina Lee is a 44 year old female with recent URI sx beginning 4-6 weeks ago, then improved until ~ 3 days ago. Now with tactile fever, myalgias, non-productive cough, anterior chest tightness and new dyspnea days. Today she feels dizzy and lightheaded, exacerbated by upright positioning, and short of breath. No recent travel or known significant infectious exposures.  No hemoptysis, leg pain or leg swelling.  No palpitations or syncope.    Allergies:  No Known Allergies    Problem List:    Patient Active Problem List    Diagnosis Date Noted     Routine general medical examination at a health care facility 10/30/2013     Priority: [facility] outpatient clinic pap and mammogram  2013       External bleeding hemorrhoids 2008     Priority: Medium        Past Medical History:    No past medical history on file.    Past Surgical History:    Past Surgical History:   Procedure Laterality Date     TONSILLECTOMY         Family History:    Family History   Problem Relation Age of Onset     Neurologic Disorder Mother         Desire's  at 41     Neurologic Disorder Maternal Grandmother         desire's      Cancer Father      Unknown/Adopted Other        Social History:  Marital Status:   [2]  Social History     Tobacco Use     Smoking status: Former Smoker     Packs/day: 0.50     Last attempt to quit: 2007     Years since quittin.1     Smokeless tobacco: Never Used     Tobacco comment: smoked for 10 years   Substance Use Topics     Alcohol use: Yes     Comment: one drink every 6 months     Drug use: No        Medications:    doxycycline hyclate 100 MG PO capsule          Review of Systems  As mentioned above in the history present illness.  All other systems were reviewed  "and are negative.      Physical Exam   BP: (!) 155/89  Pulse: 93  Temp: 99  F (37.2  C)  Resp: 18  Height: 165.1 cm (5' 5\")  Weight: 95.3 kg (210 lb)  SpO2: 98 %      Physical Exam  Vitals signs and nursing note reviewed.   Constitutional:       General: She is not in acute distress.     Appearance: Normal appearance. She is well-developed. She is not ill-appearing or diaphoretic.   HENT:      Head: Normocephalic and atraumatic.      Right Ear: External ear normal.      Left Ear: External ear normal.      Nose: Nose normal.      Mouth/Throat:      Mouth: Mucous membranes are moist.   Eyes:      General: No scleral icterus.     Extraocular Movements: Extraocular movements intact.      Conjunctiva/sclera: Conjunctivae normal.   Neck:      Musculoskeletal: Normal range of motion and neck supple.      Trachea: No tracheal deviation.   Cardiovascular:      Rate and Rhythm: Normal rate and regular rhythm.      Heart sounds: Normal heart sounds. No murmur. No friction rub. No gallop.    Pulmonary:      Effort: Pulmonary effort is normal. No tachypnea or respiratory distress.      Breath sounds: Normal breath sounds. No stridor. No decreased breath sounds, wheezing, rhonchi or rales.   Abdominal:      General: There is no distension.      Palpations: Abdomen is soft.      Tenderness: There is no abdominal tenderness.   Musculoskeletal: Normal range of motion.         General: No tenderness.      Right lower leg: She exhibits no tenderness. No edema.      Left lower leg: She exhibits no tenderness. No edema.   Skin:     General: Skin is warm and dry.      Coloration: Skin is not pale.      Findings: No erythema or rash.   Neurological:      General: No focal deficit present.      Mental Status: She is alert and oriented to person, place, and time.      Coordination: Coordination normal.   Psychiatric:         Mood and Affect: Mood normal.         Behavior: Behavior normal.         ED Course        Procedures               " EKG Interpretation:      Interpreted by Joseph Alan MD  Time reviewed: Upon completion  Symptoms at time of EKG: Chest tightness  Rhythm: Normal sinus   Rate: Normal  Axis: Normal  Ectopy: None  Conduction: Normal  ST Segments/ T Waves: No ST-T wave changes. No acute ischemic changes  Q Waves: None  Comparison to prior: No old EKG available  Clinical Impression: normal EKG           Results for orders placed or performed during the hospital encounter of 02/18/20 (from the past 24 hour(s))   Influenza A/B antigen   Result Value Ref Range    Influenza A/B Agn Specimen Nasopharyngeal     Influenza A Negative NEG^Negative    Influenza B Negative NEG^Negative   CBC with platelets differential   Result Value Ref Range    WBC 5.2 4.0 - 11.0 10e9/L    RBC Count 4.98 3.8 - 5.2 10e12/L    Hemoglobin 14.6 11.7 - 15.7 g/dL    Hematocrit 43.9 35.0 - 47.0 %    MCV 88 78 - 100 fl    MCH 29.3 26.5 - 33.0 pg    MCHC 33.3 31.5 - 36.5 g/dL    RDW 13.1 10.0 - 15.0 %    Platelet Count 199 150 - 450 10e9/L    Diff Method Automated Method     % Neutrophils 63.1 %    % Lymphocytes 28.8 %    % Monocytes 6.9 %    % Eosinophils 0.6 %    % Basophils 0.4 %    % Immature Granulocytes 0.2 %    Nucleated RBCs 0 0 /100    Absolute Neutrophil 3.3 1.6 - 8.3 10e9/L    Absolute Lymphocytes 1.5 0.8 - 5.3 10e9/L    Absolute Monocytes 0.4 0.0 - 1.3 10e9/L    Absolute Eosinophils 0.0 0.0 - 0.7 10e9/L    Absolute Basophils 0.0 0.0 - 0.2 10e9/L    Abs Immature Granulocytes 0.0 0 - 0.4 10e9/L    Absolute Nucleated RBC 0.0    Comprehensive metabolic panel   Result Value Ref Range    Sodium 140 133 - 144 mmol/L    Potassium 3.6 3.4 - 5.3 mmol/L    Chloride 108 94 - 109 mmol/L    Carbon Dioxide 26 20 - 32 mmol/L    Anion Gap 6 3 - 14 mmol/L    Glucose 82 70 - 99 mg/dL    Urea Nitrogen 13 7 - 30 mg/dL    Creatinine 0.68 0.52 - 1.04 mg/dL    GFR Estimate >90 >60 mL/min/[1.73_m2]    GFR Estimate If Black >90 >60 mL/min/[1.73_m2]    Calcium 8.8 8.5 - 10.1 mg/dL     Bilirubin Total 0.3 0.2 - 1.3 mg/dL    Albumin 3.8 3.4 - 5.0 g/dL    Protein Total 7.9 6.8 - 8.8 g/dL    Alkaline Phosphatase 78 40 - 150 U/L    ALT 24 0 - 50 U/L    AST 23 0 - 45 U/L   Troponin I   Result Value Ref Range    Troponin I ES <0.015 0.000 - 0.045 ug/L   Lactic acid whole blood   Result Value Ref Range    Lactic Acid 1.3 0.7 - 2.0 mmol/L   NT pro BNP   Result Value Ref Range    N-Terminal Pro BNP Inpatient 17 0 - 450 pg/mL   D dimer quantitative   Result Value Ref Range    D Dimer 0.7 (H) 0.0 - 0.50 ug/ml FEU   XR Chest 2 Views    Narrative    XR CHEST TWO VIEWS   2/18/2020 9:39 PM     HISTORY: 4-6 weeks of cough.    COMPARISON: Chest x-ray on 9/26/2018      Impression    IMPRESSION: No acute airspace disease.    PETE ROSE MD   CT Chest Pulmonary Embolism w Contrast    Narrative    EXAM: CT CHEST PULMONARY EMBOLISM W CONTRAST  LOCATION: [facility]  DATE/TIME: 2/18/2020 10:11 PM    INDICATION: Coughing for 4-6 weeks, dyspnea, mildly elevated d-dimer  COMPARISON: None.  TECHNIQUE: CT angiogram chest during arterial phase injection IV contrast. 2D and 3D MIP reconstructions were performed by the CT technologist. Dose reduction techniques were used.   CONTRAST: 86 mL Isovue-370    FINDINGS:  ANGIOGRAM CHEST: No evidence for pulmonary embolism. Pulmonary arteries normal in caliber. Thoracic aorta normal in caliber. No aortic dissection or other acute abnormality.    HEART: Cardiac chambers within normal limits. No pericardial effusion.    LUNGS AND PLEURA: Mild diffuse bronchial wall thickening. Subtle tree-in-bud densities noted in the upper lobes bilaterally. No pulmonary mass, consolidation, or suspicious pulmonary nodule. Moderate air trapping suspected at the lung bases. No pleural   effusion or pneumothorax.    MEDIASTINUM: There are borderline enlarged lower mediastinal and bilateral hilar lymph nodes    LIMITED UPPER ABDOMEN: Negative.    MUSCULOSKELETAL: Negative.       Impression    IMPRESSION:  1.  No evidence for pulmonary embolism.   2.  Mild changes of bronchitis/bronchiolitis.        Medications   0.9% sodium chloride BOLUS (0 mLs Intravenous Stopped 2/18/20 2251)   iopamidol (ISOVUE-370) solution 86 mL (86 mLs Intravenous Given 2/18/20 2212)   sodium chloride 0.9 % bag 500mL for CT scan flush use (100 mLs As instructed Given 2/18/20 2212)     Negative chest x-ray.  Mildly elevated d-dimer.  Will proceed with CT of the chest, PE protocol.    Assessments & Plan (with Medical Decision Making)   44-year-old female reports 4 to 6 weeks of URI symptoms which is spontaneously improved until several days ago when she developed nonproductive cough, and symptoms consistent with influenza-like illness or influenza followed by development of dizziness/lightheadedness, chest discomfort and shortness of breath today.  EKG and chest x-ray unremarkable.  Laboratory evaluation including rapid influenza was unremarkable.  Mildly elevated d-dimer and thus CT of the chest was performed and showed evidence of bronchitis/bronchiolitis.  Out of the window for Tamiflu, I will Rx Doxycycline due to her prolonged course of illness and possible secondary bacterial bronchitis.  I informed her of the borderline enlarged lower mediastinal and bilateral hilar lymph nodes seen on CT, which I suspect are infectious in etiology, but recommended she pursue follow-up imaging through her primary care provider.  She expressed understanding of this and will have her primary care provider review her CT results and facilitate this.  She was provided instructions for supportive care and will return as needed for worsened condition or worsening symptoms, or new problems or concerns.      I have reviewed the nursing notes.    I have reviewed the findings, diagnosis, plan and need for follow up with the patient.    Discharge Medication List as of 2/18/2020 11:27 PM      START taking these medications    Details    doxycycline hyclate 100 MG PO capsule Take 1 capsule (100 mg) by mouth 2 times daily for 10 days, Disp-20 capsule, R-0, Local Print             Final diagnoses:   Influenza-like illness   Acute bronchitis, unspecified organism       2/18/2020   [facility] EMERGENCY DEPARTMENT     Joseph Alan MD  02/19/20 [id number]

## 2020-02-19 NOTE — ED NOTES
Patient states She feels SHORTNESS OF AIR for the past few days she states she has had a cough for a couple weeks. She also feels dizzy and states she needs to sit down and rest and get herself together after too much activity.

## 2021-09-07 ENCOUNTER — HOSPITAL ENCOUNTER (EMERGENCY)
Facility: CLINIC | Age: 46
Discharge: HOME | End: 2021-09-07
Attending: FAMILY MEDICINE | Admitting: FAMILY MEDICINE
Payer: COMMERCIAL

## 2021-09-07 VITALS
RESPIRATION RATE: 16 BRPM | DIASTOLIC BLOOD PRESSURE: 99 MMHG | OXYGEN SATURATION: 98 % | HEART RATE: 81 BPM | TEMPERATURE: 97.8 F | SYSTOLIC BLOOD PRESSURE: 199 MMHG | BODY MASS INDEX: 36.61 KG/M2 | WEIGHT: 220 LBS

## 2021-09-07 DIAGNOSIS — H65.193 ACUTE MEE (MIDDLE EAR EFFUSION), BILATERAL: ICD-10-CM

## 2021-09-07 PROCEDURE — 99282 EMERGENCY DEPT VISIT SF MDM: CPT | Performed by: FAMILY MEDICINE

## 2021-09-07 NOTE — ED NOTES
"Patient states there is \"almost like a humming in my ear.\" States that also has been dizzy, especially when bending over and standing up - this makes dizziness the worst. Also mentions dizziness continues when eyes are closed occasionally. Denies ear pain.   "

## 2021-09-07 NOTE — ED PROVIDER NOTES
History     Chief Complaint   Patient presents with     Otalgia     Dizziness     HPI  Dorina Lee is a 46 year old female, past medical history is significant for external bleeding hemorrhoids, presents to the emergency department with concerns of bilateral ear fullness and pressure and dizziness x1 week.  History is obtained from the patient who identifies a vague pressure fullness head underwater type sensation in both ears over the preceding approximately 1-1/2 weeks.  Some associated postural change with bending over to tie her shoes or put shoes on and then when she brings her head back up gets a vague off-balance floating full feeling in her head caused 1 episode of emesis yesterday but otherwise no nausea or vomiting.  She was concerned last night with a vomiting episode.  That has not recurred but she does still have recurrent symptoms as described.  No recent URI type symptoms such as cough, runny nose, sore throat, fever, chills or sweats.  No GI changes.  No Covid exposures that she is aware of and she is fully vaccinated against Covid.  Low suspicion.      Allergies:  No Known Allergies    Problem List:    Patient Active Problem List    Diagnosis Date Noted     Routine general medical examination at a health care facility 10/30/2013     Priority: Medium     [facility] outpatient clinic pap and mammogram  2013       External bleeding hemorrhoids 2008     Priority: Medium        Past Medical History:    No past medical history on file.    Past Surgical History:    Past Surgical History:   Procedure Laterality Date     TONSILLECTOMY         Family History:    Family History   Problem Relation Age of Onset     Neurologic Disorder Mother         Desire's  at 41     Neurologic Disorder Maternal Grandmother         desire's      Cancer Father      Unknown/Adopted Other        Social History:  Marital Status:   [2]  Social History     Tobacco Use     Smoking  status: Former Smoker     Packs/day: 0.50     Quit date: 2007     Years since quittin.6     Smokeless tobacco: Never Used     Tobacco comment: smoked for 10 years   Substance Use Topics     Alcohol use: Yes     Comment: one drink every 6 months     Drug use: No        Medications:    No current outpatient medications on file.        Review of Systems   All other systems reviewed and are negative.      Physical Exam   BP: (!) 199/99  Pulse: 81  Temp: 97.8  F (36.6  C)  Resp: 16  Weight: 99.8 kg (220 lb)  SpO2: 98 %      Physical Exam  Vitals and nursing note reviewed.   Constitutional:       Appearance: Normal appearance. She is normal weight.   HENT:      Head: Normocephalic and atraumatic.      Right Ear: Tympanic membrane normal.      Left Ear: Tympanic membrane normal.      Ears:      Comments: There is a clear air-fluid level behind each tympanic membrane.     Nose: Nose normal.      Mouth/Throat:      Mouth: Mucous membranes are dry.      Pharynx: Oropharynx is clear.   Eyes:      Extraocular Movements: Extraocular movements intact.      Conjunctiva/sclera: Conjunctivae normal.      Pupils: Pupils are equal, round, and reactive to light.   Cardiovascular:      Rate and Rhythm: Normal rate and regular rhythm.      Pulses: Normal pulses.      Heart sounds: Normal heart sounds.   Pulmonary:      Effort: Pulmonary effort is normal.      Breath sounds: Normal breath sounds.   Musculoskeletal:      Cervical back: Normal range of motion and neck supple.   Neurological:      Mental Status: She is alert.         ED Course        Procedures              Critical Care time:  none               No results found for this or any previous visit (from the past 24 hour(s)).    Medications - No data to display    Assessments & Plan (with Medical Decision Making)   46-year-old female past medical history reviewed as above who presents to the emergency department with concerns of odd sensation in her ears bilaterally  associated with vertigo by description primarily posteriorly over the preceding 1 week.  There is no associated headache or concerning neurologic features described historically or on exam.  The findings on exam are consistent with bilateral middle ear effusions.  The patient has no allergies no recent URI type symptoms or recent have this however they are present.  We discussed doing Valsalva maneuver as appropriate 3-4 times a day gently to try to clear this over the next 5 to 7 days.  If not improving would need follow-up with ENT.  Unfortunately she will not be able to use oral decongestants given her blood pressure concerns i.e. untreated hypertension which she plans to follow-up on in clinic with her primary care provider.      Disclaimer: This note consists of symbols derived from keyboarding, dictation and/or voice recognition software. As a result, there may be errors in the script that have gone undetected. Please consider this when interpreting information found in this chart.      I have reviewed the nursing notes.    I have reviewed the findings, diagnosis, plan and need for follow up with the patient.          New Prescriptions    No medications on file       Final diagnoses:   Acute JL (middle ear effusion), bilateral       9/7/2021   [facility] EMERGENCY DEPT     Tavares Marie MD  09/07/21 [id number]

## 2021-09-07 NOTE — DISCHARGE INSTRUCTIONS
Valsalva maneuver as discussed 3-4 times a day over the next 5 to 7 days.  Given your blood pressure concerns we should not be using medication such as phenylephrine or Sudafed orally as this will unfortunately drive your blood pressure up significantly and is potentially dangerous.  If symptoms or not improving over the course of the next 5 to 7 days further follow-up with ENT would be recommended.  Return to the emergency department if worse or changes.  Follow-up as discussed with your primary care provider to discuss blood pressure medication.

## 2023-04-07 ENCOUNTER — LAB (OUTPATIENT)
Dept: FAMILY MEDICINE | Facility: CLINIC | Age: 48
End: 2023-04-07

## 2023-04-07 ENCOUNTER — OFFICE VISIT (OUTPATIENT)
Dept: FAMILY MEDICINE | Facility: CLINIC | Age: 48
End: 2023-04-07
Payer: COMMERCIAL

## 2023-04-07 VITALS
TEMPERATURE: 98.8 F | HEIGHT: 65 IN | WEIGHT: 215 LBS | DIASTOLIC BLOOD PRESSURE: 102 MMHG | RESPIRATION RATE: 20 BRPM | HEART RATE: 80 BPM | SYSTOLIC BLOOD PRESSURE: 184 MMHG | BODY MASS INDEX: 35.82 KG/M2

## 2023-04-07 DIAGNOSIS — Z12.31 VISIT FOR SCREENING MAMMOGRAM: ICD-10-CM

## 2023-04-07 DIAGNOSIS — Z11.4 SCREENING FOR HIV (HUMAN IMMUNODEFICIENCY VIRUS): ICD-10-CM

## 2023-04-07 DIAGNOSIS — Z12.4 CERVICAL CANCER SCREENING: ICD-10-CM

## 2023-04-07 DIAGNOSIS — Z12.11 SCREEN FOR COLON CANCER: ICD-10-CM

## 2023-04-07 DIAGNOSIS — I10 BENIGN ESSENTIAL HYPERTENSION: ICD-10-CM

## 2023-04-07 DIAGNOSIS — Z11.59 NEED FOR HEPATITIS C SCREENING TEST: ICD-10-CM

## 2023-04-07 DIAGNOSIS — Z01.419 ENCOUNTER FOR GYNECOLOGICAL EXAMINATION WITHOUT ABNORMAL FINDING: Primary | ICD-10-CM

## 2023-04-07 LAB
ANION GAP SERPL CALCULATED.3IONS-SCNC: 11 MMOL/L (ref 7–15)
BUN SERPL-MCNC: 9.3 MG/DL (ref 6–20)
CALCIUM SERPL-MCNC: 9.2 MG/DL (ref 8.6–10)
CHLORIDE SERPL-SCNC: 106 MMOL/L (ref 98–107)
CHOLEST SERPL-MCNC: 233 MG/DL
CREAT SERPL-MCNC: 0.81 MG/DL (ref 0.51–0.95)
DEPRECATED HCO3 PLAS-SCNC: 24 MMOL/L (ref 22–29)
GFR SERPL CREATININE-BSD FRML MDRD: 88 ML/MIN/1.73M2
GLUCOSE SERPL-MCNC: 89 MG/DL (ref 70–99)
HDLC SERPL-MCNC: 51 MG/DL
LDLC SERPL CALC-MCNC: 151 MG/DL
NONHDLC SERPL-MCNC: 182 MG/DL
POTASSIUM SERPL-SCNC: 3.9 MMOL/L (ref 3.4–5.3)
SODIUM SERPL-SCNC: 141 MMOL/L (ref 136–145)
TRIGL SERPL-MCNC: 157 MG/DL

## 2023-04-07 PROCEDURE — 90471 IMMUNIZATION ADMIN: CPT | Performed by: NURSE PRACTITIONER

## 2023-04-07 PROCEDURE — 90715 TDAP VACCINE 7 YRS/> IM: CPT | Performed by: NURSE PRACTITIONER

## 2023-04-07 PROCEDURE — 87624 HPV HI-RISK TYP POOLED RSLT: CPT | Performed by: NURSE PRACTITIONER

## 2023-04-07 PROCEDURE — 99214 OFFICE O/P EST MOD 30 MIN: CPT | Mod: 25 | Performed by: NURSE PRACTITIONER

## 2023-04-07 PROCEDURE — G0145 SCR C/V CYTO,THINLAYER,RESCR: HCPCS | Performed by: NURSE PRACTITIONER

## 2023-04-07 PROCEDURE — 36415 COLL VENOUS BLD VENIPUNCTURE: CPT | Performed by: NURSE PRACTITIONER

## 2023-04-07 PROCEDURE — 99396 PREV VISIT EST AGE 40-64: CPT | Mod: 25 | Performed by: NURSE PRACTITIONER

## 2023-04-07 PROCEDURE — 87389 HIV-1 AG W/HIV-1&-2 AB AG IA: CPT | Performed by: NURSE PRACTITIONER

## 2023-04-07 PROCEDURE — 86803 HEPATITIS C AB TEST: CPT | Performed by: NURSE PRACTITIONER

## 2023-04-07 PROCEDURE — 80048 BASIC METABOLIC PNL TOTAL CA: CPT | Performed by: NURSE PRACTITIONER

## 2023-04-07 PROCEDURE — 80061 LIPID PANEL: CPT | Performed by: NURSE PRACTITIONER

## 2023-04-07 RX ORDER — LISINOPRIL 10 MG/1
10 TABLET ORAL DAILY
Qty: 30 TABLET | Refills: 1 | Status: SHIPPED | OUTPATIENT
Start: 2023-04-07 | End: 2023-04-21 | Stop reason: DRUGHIGH

## 2023-04-07 ASSESSMENT — ENCOUNTER SYMPTOMS
CONSTIPATION: 0
CHILLS: 0
EYE PAIN: 0
JOINT SWELLING: 0
PARESTHESIAS: 0
HEMATURIA: 0
SHORTNESS OF BREATH: 0
HEMATOCHEZIA: 0
DIZZINESS: 0
BREAST MASS: 0
NERVOUS/ANXIOUS: 0
MYALGIAS: 0
HEADACHES: 0
FEVER: 0
PALPITATIONS: 0
FREQUENCY: 0
SORE THROAT: 0
WEAKNESS: 0
HEARTBURN: 0
NAUSEA: 0
COUGH: 0
DIARRHEA: 0
ABDOMINAL PAIN: 0
DYSURIA: 0
ARTHRALGIAS: 0

## 2023-04-07 ASSESSMENT — PAIN SCALES - GENERAL: PAINLEVEL: NO PAIN (0)

## 2023-04-07 NOTE — PROGRESS NOTES
SUBJECTIVE:   CC: Dorina is an 48 year old who presents for preventive health visit.       2023     1:44 PM   Additional Questions   Roomed by Jessenia LOMBARDO     Patient has been advised of split billing requirements and indicates understanding: Yes  Healthy Habits:     Getting at least 3 servings of Calcium per day:  Yes    Bi-annual eye exam:  NO    Dental care twice a year:  NO    Sleep apnea or symptoms of sleep apnea:  None    Diet:  Regular (no restrictions)    Frequency of exercise:  1 day/week    Duration of exercise:  Less than 15 minutes    Taking medications regularly:  Yes    Medication side effects:  Not applicable    PHQ-2 Total Score: 0    Additional concerns today:  Yes          PROBLEMS TO ADD ON...    Concerns about blood pressure  Home BPs have been high  Hasn't ever been on medications before  Has tried to cut out sodium and it hasn't made a difference  Adopted so doesn't know family history.    BP Readings from Last 3 Encounters:   23 (!) 184/102   21 (!) 199/99   20 (!) 169/105         Today's PHQ-2 Score:       2023     1:35 PM   PHQ-2 (  [employer])   Q1: Little interest or pleasure in doing things 0   Q2: Feeling down, depressed or hopeless 0   PHQ-2 Score 0   Q1: Little interest or pleasure in doing things Not at all    Not at all   Q2: Feeling down, depressed or hopeless Not at all    Not at all   PHQ-2 Score 0    0       Have you ever done Advance Care Planning? (For example, a Health Directive, POLST, or a discussion with a medical provider or your loved ones about your wishes): No, advance care planning information given to patient to review.  Advanced care planning was discussed at today's visit.    Social History     Tobacco Use     Smoking status: Former     Packs/day: 0.50     Types: Cigarettes     Quit date: 2007     Years since quittin.2     Smokeless tobacco: Never     Tobacco comments:     smoked for 10 years   Vaping Use     Vaping status: Never  "Used   Substance Use Topics     Alcohol use: Yes     Comment: one drink every 6 months             4/7/2023     1:35 PM   Alcohol Use   Prescreen: >3 drinks/day or >7 drinks/week? No     Reviewed orders with patient.  Reviewed health maintenance and updated orders accordingly - Yes      Breast Cancer Screening:    FHS-7:        View : No data to display.                Mammogram Screening: Recommended annual mammography  Pertinent mammograms are reviewed under the imaging tab.    History of abnormal Pap smear: NO - age 30-65 PAP every 5 years with negative HPV co-testing recommended     Reviewed and updated as needed this visit by clinical staff   Tobacco  Allergies  Meds              Reviewed and updated as needed this visit by Provider                     Review of Systems   Constitutional: Negative for chills and fever.   HENT: Negative for congestion, ear pain, hearing loss and sore throat.    Eyes: Negative for pain and visual disturbance.   Respiratory: Negative for cough and shortness of breath.    Cardiovascular: Negative for chest pain, palpitations and peripheral edema.   Gastrointestinal: Negative for abdominal pain, constipation, diarrhea, heartburn, hematochezia and nausea.   Breasts:  Negative for tenderness, breast mass and discharge.   Genitourinary: Negative for dysuria, frequency, genital sores, hematuria, pelvic pain, urgency, vaginal bleeding and vaginal discharge.   Musculoskeletal: Negative for arthralgias, joint swelling and myalgias.   Skin: Negative for rash.   Neurological: Negative for dizziness, weakness, headaches and paresthesias.   Psychiatric/Behavioral: Negative for mood changes. The patient is not nervous/anxious.           OBJECTIVE:   BP (!) 184/102 (BP Location: Right arm, Cuff Size: Adult Large)   Pulse 80   Temp 98.8  F (37.1  C) (Tympanic)   Resp 20   Ht 1.638 m (5' 4.5\")   Wt 97.5 kg (215 lb)   LMP 03/27/2023 (Approximate)   BMI 36.33 kg/m    Physical Exam  GENERAL " APPEARANCE: healthy, alert and no distress  EYES: Eyes grossly normal to inspection, PERRL and conjunctivae and sclerae normal  HENT: ear canals and TM's normal, nose and mouth without ulcers or lesions, oropharynx clear and oral mucous membranes moist  NECK: no adenopathy, no asymmetry, masses, or scars and thyroid normal to palpation  RESP: lungs clear to auscultation - no rales, rhonchi or wheezes  BREAST: normal without masses, tenderness or nipple discharge and no palpable axillary masses or adenopathy  CV: regular rate and rhythm, normal S1 S2, no S3 or S4, no murmur, click or rub, no peripheral edema and peripheral pulses strong  ABDOMEN: soft, nontender, no hepatosplenomegaly, no masses and bowel sounds normal   (female): normal female external genitalia, normal urethral meatus, vaginal mucosal atrophy noted, normal cervix, adnexae, and uterus without masses or abnormal discharge  MS: no musculoskeletal defects are noted and gait is age appropriate without ataxia  SKIN: no suspicious lesions or rashes  NEURO: Normal strength and tone, sensory exam grossly normal, mentation intact and speech normal  PSYCH: mentation appears normal and affect normal/bright        ASSESSMENT/PLAN:       ICD-10-CM    1. Encounter for gynecological examination without abnormal finding  Z01.419 Lipid panel reflex to direct LDL Non-fasting      2. Benign essential hypertension  I10 New diagnosis.  Start lisinopril  Follow up in 2 weeks.  Basic metabolic panel  (Ca, Cl, CO2, Creat, Gluc, K, Na, BUN)     lisinopril (ZESTRIL) 10 MG tablet     OFFICE/OUTPT VISIT,EST,LEVL III      3. Screen for colon cancer  Z12.11 COLOGUARD(EXACT SCIENCES)      4. Screening for HIV (human immunodeficiency virus)  Z11.4 HIV Antigen Antibody Combo      5. Need for hepatitis C screening test  Z11.59 Hepatitis C Screen Reflex to HCV RNA Quant and Genotype      6. Cervical cancer screening  Z12.4 Pap Screen with HPV - recommended age 30 - 65 years      7.  Visit for screening mammogram  Z12.31 MA SCREENING DIGITAL BILAT - Future  (s+30)          Patient has been advised of split billing requirements and indicates understanding: Yes by AFR and CMA      COUNSELING:  Reviewed preventive health counseling, as reflected in patient instructions        She reports that she quit smoking about 16 years ago. Her smoking use included cigarettes. She smoked an average of .5 packs per day. She has never used smokeless tobacco.      The risks, benefits and treatment options of prescribed medications or other treatments have been discussed with the patient. The patient verbalized their understanding and should call or follow up if no improvement or if they develop further problems.    NEDRA [facility]

## 2023-04-07 NOTE — LETTER
April 10, 2023      Dorina Murrellguidorocio  [address] [facility] [address]        Dear ,    We are writing to inform you of your test results.    Kidney function, electrolytes and blood sugar were normal.   HIV and hepatitis C screening tests are negative.     Your cholesterol is mildly elevated.  You do not need medication at this time, however I recommend working on heart healthy diet, regular physical activity and weight loss to control your cholesterol.     Here some basic advice to get you started.  If you need more help, let me know and I will refer you to our dietitian.     Good fat versus bad fat:     Saturated fats are bad for cholesterol.  These are fats that are solid at room temperature.  You should limit or avoid these bad fats.  Examples are whole milk products, cheese, butter, cream, fatty meats, red meat, poultry skin, cold cuts, baked goods, highly processed foods, margarine, vegetable shortenings, foods with partially hydrogenated vegetable oil, coconut oil.     Unsaturated fats are good fats and help lower your LDL (bad cholesterol).  You should choose to eat more of these good fats, examples are olive oil, canola oil, nuts, seeds, avocado, almonds, pecans, cashews, pistachios, fish oil, corn oil, soybean oil, safflower oil, sunflower oil, walnuts.     Choose omega-3 fats, including salmon, sardines, bluefish, mackerel, walnut, canola, soybean, flaxseed.         Good carbs versus bad carbs:     Minimize sugar and white flour, including white bread, white rice and other refined grains.  Limit potatoes.  Minimize processed food and sugar sweetened beverages such as sodas and fruit drinks.     Eat more whole fruits (instead of fruit juice).  Eat more beans and legumes.  Snack on raw vegetables instead of chips, crackers, or chocolate bars.     Eat more whole grains, such as whole-wheat, barley, wheat berries, quinoa, oats and brown rice.       Good websites:  "    www.oldwayspt.org   www.eatright.org       Recommend following the Mediterranean diet. This is lifestyle, not a \"diet\". It has been proved to be effective in reducing weight, BMI, and waist circumference; decreasing total cholesterol and increasing HDL; better glycemic control and reduce insulin resistance. It also has been shown to decrease cardiovascular disease.         Tamera Wick CNP       The 10-year ASCVD risk score (Kathy RENAE, et al., 2019) is: 4.6%     Values used to calculate the score:       Age: 48 years       Sex: Female       Is Non- : No       Diabetic: No       Tobacco smoker: No       Systolic Blood Pressure: 184 mmHg       Is BP treated: Yes       HDL Cholesterol: 51 mg/dL       Total Cholesterol: 233 mg/dL     Resulted Orders   HIV Antigen Antibody Combo   Result Value Ref Range    HIV Antigen Antibody Combo Nonreactive Nonreactive      Comment:      HIV-1 p24 Ag & HIV-1/HIV-2 Ab Not Detected   Hepatitis C Screen Reflex to HCV RNA Quant and Genotype   Result Value Ref Range    Hepatitis C Antibody Nonreactive Nonreactive    Narrative    Assay performance characteristics have not been established for newborns, infants, and children.   Lipid panel reflex to direct LDL Non-fasting   Result Value Ref Range    Cholesterol 233 (H) <200 mg/dL    Triglycerides 157 (H) <150 mg/dL    Direct Measure HDL 51 >=50 mg/dL    LDL Cholesterol Calculated 151 (H) <=100 mg/dL    Non HDL Cholesterol 182 (H) <130 mg/dL    Narrative    Cholesterol  Desirable:  <200 mg/dL    Triglycerides  Normal:  Less than 150 mg/dL  Borderline High:  150-199 mg/dL  High:  200-499 mg/dL  Very High:  Greater than or equal to 500 mg/dL    Direct Measure HDL  Female:  Greater than or equal to 50 mg/dL   Male:  Greater than or equal to 40 mg/dL    LDL Cholesterol  Desirable:  <100mg/dL  Above Desirable:  100-129 mg/dL   Borderline High:  130-159 mg/dL   High:  160-189 mg/dL   Very High:  >= 190 mg/dL    Non " HDL Cholesterol  Desirable:  130 mg/dL  Above Desirable:  130-159 mg/dL  Borderline High:  160-189 mg/dL  High:  190-219 mg/dL  Very High:  Greater than or equal to 220 mg/dL   Basic metabolic panel  (Ca, Cl, CO2, Creat, Gluc, K, Na, BUN)   Result Value Ref Range    Sodium 141 136 - 145 mmol/L    Potassium 3.9 3.4 - 5.3 mmol/L    Chloride 106 98 - 107 mmol/L    Carbon Dioxide (CO2) 24 22 - 29 mmol/L    Anion Gap 11 7 - 15 mmol/L    Urea Nitrogen 9.3 6.0 - 20.0 mg/dL    Creatinine 0.81 0.51 - 0.95 mg/dL    Calcium 9.2 8.6 - 10.0 mg/dL    Glucose 89 70 - 99 mg/dL    GFR Estimate 88 >60 mL/min/1.73m2      Comment:      eGFR calculated using 2021 CKD-EPI equation.       If you have any questions or concerns, please call the clinic at the number listed above.       Sincerely,      NEDRA Tate CNP

## 2023-04-07 NOTE — LETTER
May 2, 2023      Dorina Lee  [address] [facility] [address]        Dear ,    We are writing to inform you of your test results.    Your cologuard test results came back negative. You are not suspected to have colorectal cancer cancer. Please repeat this test in 3 years. If you have any questions, please make a follow up appointment with your PCP provider.     Resulted Orders   COLOGUARD(EXACT SCIENCES)   Result Value Ref Range    COLOGUARD-ABSTRACT Negative Negative      Comment:        NEGATIVE TEST RESULT. A negative Cologuard result indicates a low likelihood that a colorectal cancer (CRC) or advanced adenoma (adenomatous polyps with more advanced pre-malignant features)  is present. The chance that a person with a negative Cologuard test has a colorectal cancer is less than 1 in 1500 (negative predictive value >99.9%) or has an  advanced adenoma is less than  5.3% (negative predictive value 94.7%). These data are based on a prospective cross-sectional study of 10,000 individuals at average risk for colorectal cancer who were screened with both Cologuard and colonoscopy. (Mary Ellen HANKS et al, N Engl J Med 2014;370(14):2915-4111) The normal value (reference range) for this assay is negative.    COLOGUARD RE-SCREENING RECOMMENDATION: Periodic colorectal cancer screening is an important part of preventive healthcare for asymptomatic individuals at average risk for colorectal cancer.  Following a negative Cologuard result, the American Cancer Society and U.S.  Multi-Society Task Force screening guidelines recommend a Cologuard re-screening interval of 3 years.   References: American Cancer Society Guideline for Colorectal Cancer Screening: https://www.cancer.org/cancer/colon-rectal-cancer/rkchyrcap-ekkzwnaiu-rbrluqn/acs-recommendations.html.; Catarino RENAE, Keith CR, Dima PERKINS, Colorectal Cancer Screening: Recommendations for Physicians and Patients from the U.S. Multi-Society Task  Force on Colorectal Cancer Screening , Am J Gastroenterology 2017; 112:6810-2607.    TEST DESCRIPTION: Composite algorithmic analysis of stool DNA-biomarkers with hemoglobin immunoassay.   Quantitative values of individual biomarkers are not reportable and are not associated with individual biomarker result reference ranges. Cologuard is intended for colorectal cancer screening of adults of either sex, 45 years or older, who are at average-risk for colorectal cancer (CRC). Cologuard has been approved for use by the U.S. FDA. The performance of Cologuard was  established in a cross sectional study of average-risk adults aged 50-84. Cologuard performance in patients ages 45 to 49 years was estimated by sub-group analysis of near-age groups. Colonoscopies performed for a positive result may find as the most clinically significant lesion: colorectal cancer [4.0%], advanced adenoma (including sessile serrated polyps greater than or equal to 1cm diameter) [20%] or non- advanced adenoma [31%]; or no colorectal neoplasia [45%]. These estimates are derived from a prospective cross-sectional screening study of 10,000 individuals at average risk for colorectal cancer who were screened with both Cologuard and colonoscopy. (Mary Ellen Lujan al, N Engl J Med 2014;370(14):0531-9873.) Cologuard may produce a false negative or false positive result (no colorectal cancer or precancerous polyp present at colonoscopy follow up). A negative Cologuard test result does not guarantee the absence of CRC or advanced adenoma (pre-cancer). The current Cologuard  screening interval is every 3 years. (American Cancer Society and U.S. Multi-Society Task Force). Cologuard performance data in a 10,000 patient pivotal study using colonoscopy as the reference method can be accessed at the following location: www.[employer].com/results. Additional description of the Cologuard test process, warnings and precautions can be found at [email or web address].          If you have any questions or concerns, please call the clinic at the number listed above.       Sincerely,      NEDRA Tate CNP

## 2023-04-08 LAB
HCV AB SERPL QL IA: NONREACTIVE
HIV 1+2 AB+HIV1 P24 AG SERPL QL IA: NONREACTIVE

## 2023-04-11 LAB
BKR LAB AP GYN ADEQUACY: NORMAL
BKR LAB AP GYN INTERPRETATION: NORMAL
BKR LAB AP HPV REFLEX: NORMAL
BKR LAB AP PREVIOUS ABNORMAL: NORMAL
PATH REPORT.COMMENTS IMP SPEC: NORMAL
PATH REPORT.COMMENTS IMP SPEC: NORMAL
PATH REPORT.RELEVANT HX SPEC: NORMAL

## 2023-04-12 LAB
HUMAN PAPILLOMA VIRUS 16 DNA: NEGATIVE
HUMAN PAPILLOMA VIRUS 18 DNA: NEGATIVE
HUMAN PAPILLOMA VIRUS FINAL DIAGNOSIS: NORMAL
HUMAN PAPILLOMA VIRUS OTHER HR: NEGATIVE

## 2023-04-21 ENCOUNTER — OFFICE VISIT (OUTPATIENT)
Dept: FAMILY MEDICINE | Facility: CLINIC | Age: 48
End: 2023-04-21
Payer: COMMERCIAL

## 2023-04-21 VITALS
BODY MASS INDEX: 35.65 KG/M2 | RESPIRATION RATE: 12 BRPM | HEART RATE: 62 BPM | OXYGEN SATURATION: 99 % | DIASTOLIC BLOOD PRESSURE: 90 MMHG | HEIGHT: 65 IN | SYSTOLIC BLOOD PRESSURE: 134 MMHG | TEMPERATURE: 98.9 F | WEIGHT: 214 LBS

## 2023-04-21 DIAGNOSIS — E66.01 MORBID OBESITY (H): ICD-10-CM

## 2023-04-21 DIAGNOSIS — I10 BENIGN ESSENTIAL HYPERTENSION: Primary | ICD-10-CM

## 2023-04-21 PROCEDURE — 99214 OFFICE O/P EST MOD 30 MIN: CPT | Performed by: NURSE PRACTITIONER

## 2023-04-21 RX ORDER — LISINOPRIL 10 MG/1
10 TABLET ORAL DAILY
Qty: 30 TABLET | Refills: 1 | Status: CANCELLED | OUTPATIENT
Start: 2023-04-21

## 2023-04-21 RX ORDER — LISINOPRIL 20 MG/1
20 TABLET ORAL DAILY
Qty: 90 TABLET | Refills: 3 | Status: SHIPPED | OUTPATIENT
Start: 2023-04-21 | End: 2023-05-23 | Stop reason: DRUGHIGH

## 2023-04-21 ASSESSMENT — PAIN SCALES - GENERAL: PAINLEVEL: NO PAIN (0)

## 2023-04-21 NOTE — PROGRESS NOTES
Assessment & Plan     Benign essential hypertension  Improving with lisinopril  Diastolic BP still elevated  Increase lisinopril to 20 mg daily.  RN recheck in 4 weeks  - lisinopril (ZESTRIL) 20 MG tablet; Take 1 tablet (20 mg) by mouth daily    Morbid obesity (H)  Encourage weight loss  Would likely help with blood pressure control.      The risks, benefits and treatment options of prescribed medications or other treatments have been discussed with the patient. The patient verbalized their understanding and should call or follow up if no improvement or if they develop further problems.    NEDRA Tate CNP  [facility] 48 year [facility], presenting for the following health issues:  Hypertension (Started new medication at last office visit.) and Health Maintenance (Declined shingrix today;   Hep B discussion?)        4/21/2023     9:51 AM   Additional Questions   Roomed by Jessenia LOMBARDO     History of Present Illness       Hypertension: She presents for follow up of hypertension.  She does not check blood pressure  regularly outside of the clinic. Outside blood pressures have been over 140/90. She does not follow a low salt diet.     She eats 2-3 servings of fruits and vegetables daily.She consumes 1 sweetened beverage(s) daily.She exercises with enough effort to increase her heart rate 10 to 19 minutes per day.  She exercises with enough effort to increase her heart rate 3 or less days per week.   She is taking medications regularly.       BP Readings from Last 6 Encounters:   04/21/23 (!) 134/90   04/07/23 (!) 184/102   09/07/21 (!) 199/99   02/18/20 (!) 169/105   12/17/19 136/86   01/23/19 (!) 164/120         Obesity:    Wt Readings from Last 4 Encounters:   04/21/23 97.1 kg (214 lb)   04/07/23 97.5 kg (215 lb)   09/07/21 99.8 kg (220 lb)   02/18/20 95.3 kg (210 lb)                 Review of Systems   Constitutional, HEENT, cardiovascular, pulmonary, gi and  "gu systems are negative, except as otherwise noted.      Objective    BP (!) 134/90 (BP Location: Right arm, Cuff Size: Adult Large)   Pulse 62   Temp 98.9  F (37.2  C) (Tympanic)   Resp 12   Ht 1.638 m (5' 4.5\")   Wt 97.1 kg (214 lb)   LMP 03/27/2023 (Approximate)   SpO2 99%   BMI 36.17 kg/m    Body mass index is 36.17 kg/m .  Physical Exam   GENERAL: healthy, alert and no distress  RESP: lungs clear to auscultation - no rales, rhonchi or wheezes  CV: regular rate and rhythm, normal S1 S2, no S3 or S4, no murmur, click or rub, no peripheral edema and peripheral pulses strong  MS: no gross musculoskeletal defects noted, no edema                    "

## 2023-04-28 LAB — NONINV COLON CA DNA+OCC BLD SCRN STL QL: NEGATIVE

## 2023-05-02 NOTE — RESULT ENCOUNTER NOTE
Hello,     Your cologuard test results came back negative. You are not suspected to have colorectal cancer cancer. Please repeat this test in 3 years. If you have any questions, please make a follow up appointment with your PCP provider.     Sincerely,     Minerva Bañuelos MD

## 2023-05-22 ENCOUNTER — ALLIED HEALTH/NURSE VISIT (OUTPATIENT)
Dept: FAMILY MEDICINE | Facility: CLINIC | Age: 48
End: 2023-05-22
Payer: COMMERCIAL

## 2023-05-22 ENCOUNTER — TELEPHONE (OUTPATIENT)
Dept: FAMILY MEDICINE | Facility: CLINIC | Age: 48
End: 2023-05-22

## 2023-05-22 VITALS — HEART RATE: 60 BPM | DIASTOLIC BLOOD PRESSURE: 92 MMHG | SYSTOLIC BLOOD PRESSURE: 156 MMHG

## 2023-05-22 DIAGNOSIS — I10 BENIGN ESSENTIAL HYPERTENSION: Primary | ICD-10-CM

## 2023-05-22 PROCEDURE — 99207 PR NO CHARGE NURSE ONLY: CPT

## 2023-05-22 NOTE — TELEPHONE ENCOUNTER
Dorina Lee is a 48 year old year old patient who comes in today for a Blood Pressure check because of medication adjustment.     Pt saw Tamera Wick on 4/21/23 and lisinopril was increased from 10 to 20 mg daily.     Vital Signs as repeated by RN:  156/92, pulse 56  156/92, pulse 60, rechecked 5 min later.     Patient is taking medication as prescribed  Patient is tolerating medications well.    Pt says that she has a monitor but has not been checking home BP's.      Current complaints: none  Disposition:  Routed to provider for further instructions.     Preferred pharmacy is [facility].     Genoveva Araya RN

## 2023-05-22 NOTE — PROGRESS NOTES
Dorina Lee is a 48 year old year old patient who comes in today for a Blood Pressure check because of medication adjustment.    Pt saw Tamera Wick on 4/21/23 and lisinopril was increased from 10 to 20 mg daily.    Vital Signs as repeated by RN:  156/92, pulse 56  156/92, pulse 60, rechecked 5 min later.    Patient is taking medication as prescribed  Patient is tolerating medications well.    Pt says that she has a monitor but has not been checking home BP's.     Current complaints: none  Disposition:  Routed to provider for further instructions.    [facility]    Genoveva Araya RN

## 2023-05-23 RX ORDER — LISINOPRIL 40 MG/1
40 TABLET ORAL DAILY
Qty: 90 TABLET | Refills: 3 | Status: SHIPPED | OUTPATIENT
Start: 2023-05-23 | End: 2023-09-25 | Stop reason: SINTOL

## 2023-05-23 NOTE — TELEPHONE ENCOUNTER
BP Readings from Last 6 Encounters:   05/22/23 (!) 156/92   04/21/23 (!) 134/90   04/07/23 (!) 184/102   09/07/21 (!) 199/99   02/18/20 (!) 169/105   12/17/19 136/86         Blood pressure still not at goal.  Recommend increasing lisinopril to 40 mg daily.  New prescription sent in.  Patient should follow-up with the nurse for another blood pressure check in 2 to 4 weeks.  She will also need a BMP at that time    Tamera Wick, CNP

## 2023-06-05 ENCOUNTER — ALLIED HEALTH/NURSE VISIT (OUTPATIENT)
Dept: FAMILY MEDICINE | Facility: CLINIC | Age: 48
End: 2023-06-05
Payer: COMMERCIAL

## 2023-06-05 ENCOUNTER — LAB (OUTPATIENT)
Dept: LAB | Facility: CLINIC | Age: 48
End: 2023-06-05
Payer: COMMERCIAL

## 2023-06-05 VITALS — HEART RATE: 76 BPM | SYSTOLIC BLOOD PRESSURE: 138 MMHG | DIASTOLIC BLOOD PRESSURE: 84 MMHG

## 2023-06-05 DIAGNOSIS — I10 BENIGN ESSENTIAL HYPERTENSION: Primary | ICD-10-CM

## 2023-06-05 DIAGNOSIS — I10 BENIGN ESSENTIAL HYPERTENSION: ICD-10-CM

## 2023-06-05 LAB
ANION GAP SERPL CALCULATED.3IONS-SCNC: 6 MMOL/L (ref 7–15)
BUN SERPL-MCNC: 8.5 MG/DL (ref 6–20)
CALCIUM SERPL-MCNC: 8.8 MG/DL (ref 8.6–10)
CHLORIDE SERPL-SCNC: 107 MMOL/L (ref 98–107)
CREAT SERPL-MCNC: 0.73 MG/DL (ref 0.51–0.95)
DEPRECATED HCO3 PLAS-SCNC: 26 MMOL/L (ref 22–29)
GFR SERPL CREATININE-BSD FRML MDRD: >90 ML/MIN/1.73M2
GLUCOSE SERPL-MCNC: 83 MG/DL (ref 70–99)
POTASSIUM SERPL-SCNC: 4.4 MMOL/L (ref 3.4–5.3)
SODIUM SERPL-SCNC: 139 MMOL/L (ref 136–145)

## 2023-06-05 PROCEDURE — 99207 PR NO CHARGE NURSE ONLY: CPT

## 2023-06-05 PROCEDURE — 80048 BASIC METABOLIC PNL TOTAL CA: CPT

## 2023-06-05 PROCEDURE — 36415 COLL VENOUS BLD VENIPUNCTURE: CPT

## 2023-06-11 ENCOUNTER — HOSPITAL ENCOUNTER (EMERGENCY)
Facility: CLINIC | Age: 48
Discharge: HOME | End: 2023-06-11
Attending: PHYSICIAN ASSISTANT | Admitting: PHYSICIAN ASSISTANT
Payer: COMMERCIAL

## 2023-06-11 ENCOUNTER — APPOINTMENT (OUTPATIENT)
Dept: GENERAL RADIOLOGY | Facility: CLINIC | Age: 48
End: 2023-06-11
Attending: PHYSICIAN ASSISTANT
Payer: COMMERCIAL

## 2023-06-11 VITALS
RESPIRATION RATE: 18 BRPM | OXYGEN SATURATION: 99 % | TEMPERATURE: 98.2 F | HEART RATE: 68 BPM | SYSTOLIC BLOOD PRESSURE: 154 MMHG | DIASTOLIC BLOOD PRESSURE: 90 MMHG

## 2023-06-11 DIAGNOSIS — M25.562 LEFT KNEE PAIN: ICD-10-CM

## 2023-06-11 PROCEDURE — 99213 OFFICE O/P EST LOW 20 MIN: CPT | Performed by: PHYSICIAN ASSISTANT

## 2023-06-11 PROCEDURE — 73562 X-RAY EXAM OF KNEE 3: CPT | Mod: LT

## 2023-06-11 PROCEDURE — G0463 HOSPITAL OUTPT CLINIC VISIT: HCPCS | Performed by: PHYSICIAN ASSISTANT

## 2023-06-11 NOTE — ED PROVIDER NOTES
History     Chief Complaint   Patient presents with     Knee Pain     HPI  Dorina Lee is a 48 year old female who presents to urgent concern over left knee pain.  Patient reports ongoing intermittent pains of both of her knees however left greater than right for some time.  We did wax and wane in intensity and frequency.  Last night while walking down the [place]x prior to going to bed she had sudden onset of worsening pain in her left knee with associated swelling.  She states difficulty sleeping due to discomfort.  She denies any fever, chills, myalgias, cough, chest pain, dyspnea, wheezing, distal numbness or paresthesia.  She has attempted to treat with with OTC medications without relief.      Allergies:  No Known Allergies    Problem List:    Patient Active Problem List    Diagnosis Date Noted     Morbid obesity (H) 2023     Priority: Medium     Benign essential hypertension 2023     Priority: Medium     Routine general medical examination at a health care facility 10/30/2013     [place] [facility] [place] [facility] [place]       External bleeding hemorrhoids 2008     Priority: Medium        Past Medical History:    No past medical history on file.    Past Surgical History:    Past Surgical History:   Procedure Laterality Date     TONSILLECTOMY       Family History:    Family History   Problem Relation Age of Onset     Neurologic Disorder Mother         Desire's  at 41     Neurologic Disorder Maternal Grandmother         desire's      Cancer Father      Unknown/Adopted Other      Social History:  Marital Status:   [2]  Social History     Tobacco Use     Smoking status: Former     Packs/day: 0.50     Types: Cigarettes     Quit date: 2007     Years since quittin.4     Smokeless tobacco: Never     Tobacco comments:     smoked for 10 years   Vaping Use     Vaping status: Never Used   Substance Use Topics     Alcohol  use: Yes     Comment: one drink every 6 months     Drug use: No      Medications:    lisinopril (ZESTRIL) 40 MG tablet      Review of Systems  CONSTITUTIONAL:NEGATIVE for fever, chills, change in weight  INTEGUMENTARY/SKIN: NEGATIVE for worrisome rashes, moles or lesions  RESP:NEGATIVE for significant cough or SOB  MUSCULOSKELETAL: POSITIVE  for left knee pain, swelling and NEGATIVE for other concerning arthralgias or myalgias   NEURO: NEGATIVE for numbness, paresthesias   Physical Exam   BP: (!) 154/90  Pulse: 68  Temp: 98.2  F (36.8  C)  Resp: 18  SpO2: 99 %  Physical Exam  Constitutional:       General: She is not in acute distress.     Appearance: She is not ill-appearing or toxic-appearing.   HENT:      Head: Normocephalic and atraumatic.   Cardiovascular:      Pulses:           Dorsalis pedis pulses are 2+ on the left side.        Posterior tibial pulses are 2+ on the left side.   Musculoskeletal:      Left upper leg: Normal.      Left knee: Swelling present. No deformity, effusion, erythema, ecchymosis, lacerations or crepitus. Tenderness present. Normal alignment and normal patellar mobility.      Left lower leg: Normal.      Left ankle: Normal.   Skin:     General: Skin is warm and dry.      Capillary Refill: Capillary refill takes less than 2 seconds.      Findings: No abrasion, ecchymosis, erythema, laceration or rash.   Neurological:      Mental Status: She is alert.      Sensory: No sensory deficit.       ED Course                 Procedures       Critical Care time:  none        Results for orders placed or performed during the hospital encounter of 06/11/23   XR Knee Left 3 Views     Status: None    Narrative    EXAM: XR KNEE LEFT 3 VIEWS  LOCATION: [facility]  DATE/TIME: 06/11/2023, 12:07 PM CDT    INDICATION: Left knee pain.  COMPARISON: None.      Impression    IMPRESSION: Normal joint spaces and alignment. No fracture or joint effusion.         Medications - No data to  display    Assessments & Plan (with Medical Decision Making)     I have reviewed the nursing notes.    I have reviewed the findings, diagnosis, plan and need for follow up with the patient.        Discharge Medication List as of 6/11/2023 12:33 PM        Final diagnoses:   Left knee pain     78-year-old female presents to urgent care with concern of left knee pain was present intermittently for extended period of time however worse in the last 24 hours after she had sudden onset of pain while walking. Physical exam findings significant for some swelling of the knee, tenderness palpation, decreased range of motion secondary to discomfort, distal neurovascular status was intact.  As part of evaluation she did have x-ray which was negative for acute fracture, joint effusion.  Normal joint space and alignment.  Differential of her pain can osteoarthritis, soft tissue injury, meniscus tear, tendonitis.  She was discharged home stable with instructions for symptomatic treatment. Follow up with PCP or ortho if no improvement in 5-7 days worrisome reasons to return to ER/UC sooner discussed.     Disclaimer: This note consists of symbols derived from keyboarding, dictation, and/or voice recognition software. As a result, there may be errors in the script that have gone undetected.  Please consider this when interpreting information found in the chart.      6/11/2023   [facility] EMERGENCY DEPT     Linda Velasquez PA-C  06/14/23 2120

## 2023-09-19 ENCOUNTER — TELEPHONE (OUTPATIENT)
Dept: FAMILY MEDICINE | Facility: CLINIC | Age: 48
End: 2023-09-19
Payer: COMMERCIAL

## 2023-09-19 NOTE — TELEPHONE ENCOUNTER
General Call    Contacts         Type Contact Phone/Fax    09/19/2023 12:02 PM CDT Phone (Incoming) Dorina Lee (Self)           Reason for Call:  pt calling stating LISINOPRIL is making her cough all the time, and feels like a tickle in her throat constantly. Pt looking for something else to try, please send to  in [place] for pharmacy.         Okay to leave a detailed message?: Yes at Cell number on file:    Telephone Information:   Mobile [phone]     .Chapis Reddy [facility]

## 2023-09-20 NOTE — TELEPHONE ENCOUNTER
Pt was made an appt to discuss changing her blood pressure medicine.  She has a frequent cough from it.  No other problems or symptoms.  BP is in normal range at home , she said.

## 2023-09-25 ENCOUNTER — VIRTUAL VISIT (OUTPATIENT)
Dept: FAMILY MEDICINE | Facility: CLINIC | Age: 48
End: 2023-09-25
Payer: COMMERCIAL

## 2023-09-25 DIAGNOSIS — I10 BENIGN ESSENTIAL HYPERTENSION: Primary | ICD-10-CM

## 2023-09-25 PROCEDURE — 99213 OFFICE O/P EST LOW 20 MIN: CPT | Mod: VID | Performed by: NURSE PRACTITIONER

## 2023-09-25 RX ORDER — LOSARTAN POTASSIUM 100 MG/1
100 TABLET ORAL DAILY
Qty: 90 TABLET | Refills: 3 | Status: SHIPPED | OUTPATIENT
Start: 2023-09-25

## 2023-09-25 NOTE — PROGRESS NOTES
Dorina is a 48 year old who is being evaluated via a billable video visit.      How would you like to obtain your AVS? MyChart  If the video visit is dropped, the invitation should be resent by: Send to e-mail at: [email or web address] - [employer]   Will anyone else be joining your video visit? No          Assessment & Plan     Benign essential hypertension  Coughing with lisinopril  Switch to losartan.  Recheck BP with the RN in 2 weeks.  - losartan (COZAAR) 100 MG tablet; Take 1 tablet (100 mg) by mouth daily      The risks, benefits and treatment options of prescribed medications or other treatments have been discussed with the patient. The patient verbalized their understanding and should call or follow up if no improvement or if they develop further problems.  NEDRA Tate [facility]              Ann Cam is a 48 year old, presenting for the following health issues:  Hypertension (Side effects from medication )      History of Present Illness       Hypertension: She presents for follow up of hypertension.  She does not check blood pressure  regularly outside of the clinic. Outpatient blood pressures have not been over 140/90. She does not follow a low salt diet.     She eats 0-1 servings of fruits and vegetables daily.She consumes 1 sweetened beverage(s) daily.She exercises with enough effort to increase her heart rate 10 to 19 minutes per day.  She exercises with enough effort to increase her heart rate 3 or less days per week. She is missing 1 dose(s) of medications per week.  She is not taking prescribed medications regularly due to remembering to take.     Medication Followup of Lisinopril   Taking Medication as prescribed: yes  Side Effects:  Coughing. States she was sick when she started this medication but cough never subsided. Dry cough.   Medication Helping Symptoms:  not checking blood pressures regularly       BP Readings from Last 6 Encounters:    06/11/23 (!) 154/90   06/05/23 138/84   05/22/23 (!) 156/92   04/21/23 (!) 134/90   04/07/23 (!) 184/102   09/07/21 (!) 199/99             Review of Systems   Constitutional, HEENT, cardiovascular, pulmonary, gi and gu systems are negative, except as otherwise noted.      Objective           Vitals:  No vitals were obtained today due to virtual visit.    Physical Exam   GENERAL: Healthy, alert and no distress  EYES: Eyes grossly normal to inspection.  No discharge or erythema, or obvious scleral/conjunctival abnormalities.  RESP: No audible wheeze, cough, or visible cyanosis.  No visible retractions or increased work of breathing.    SKIN: Visible skin clear. No significant rash, abnormal pigmentation or lesions.  NEURO: Cranial nerves grossly intact.  Mentation and speech appropriate for age.  PSYCH: Mentation appears normal, affect normal/bright, judgement and insight intact, normal speech and appearance well-groomed.                Video-Visit Details    Type of service:  Video Visit     Originating Location (pt. Location): Home    Distant Location (provider location):  On-site  Platform used for Video Visit: [employer]

## 2023-10-10 ENCOUNTER — TELEPHONE (OUTPATIENT)
Dept: FAMILY MEDICINE | Facility: CLINIC | Age: 48
End: 2023-10-10

## 2023-10-10 ENCOUNTER — ALLIED HEALTH/NURSE VISIT (OUTPATIENT)
Dept: FAMILY MEDICINE | Facility: CLINIC | Age: 48
End: 2023-10-10
Payer: COMMERCIAL

## 2023-10-10 VITALS — SYSTOLIC BLOOD PRESSURE: 158 MMHG | HEART RATE: 60 BPM | DIASTOLIC BLOOD PRESSURE: 100 MMHG

## 2023-10-10 DIAGNOSIS — I10 BENIGN ESSENTIAL HYPERTENSION: Primary | ICD-10-CM

## 2023-10-10 PROCEDURE — 99207 PR NO CHARGE NURSE ONLY: CPT

## 2023-10-10 RX ORDER — HYDROCHLOROTHIAZIDE 12.5 MG/1
12.5 TABLET ORAL DAILY
Qty: 30 TABLET | Refills: 1 | Status: SHIPPED | OUTPATIENT
Start: 2023-10-10 | End: 2023-10-25

## 2023-10-10 NOTE — TELEPHONE ENCOUNTER
Dorina returned call, message relayed. She will call tomorrow to schedule lab and nurse only appointments.  Tammie HERNANDEZ RN

## 2023-10-10 NOTE — TELEPHONE ENCOUNTER
BP Readings from Last 6 Encounters:   10/10/23 (!) 158/100   06/11/23 (!) 154/90   06/05/23 138/84   05/22/23 (!) 156/92   04/21/23 (!) 134/90   04/07/23 (!) 184/102     Blood pressure is still not well controlled.  Continue losartan 100 mg daily.  Add hydrochlorothiazide 12.5 mg daily.  Follow-up with RN again in 2 weeks for blood pressure check and lab check.  Orders were placed.  Tamera Wick, CNP

## 2023-10-10 NOTE — NURSING NOTE
Dorina Lee is a 48 year old year old patient who comes in today for a Blood Pressure check because of medication change. Switched from lisinopril to losartan 100mg daily on 9/25/23.    Vital Signs as repeated by RN: BP- 154/98, P- 60. Rechecked after 5 minutes: BP- 158/100.    Patient is taking medication as prescribed  Patient is tolerating medications well.  Patient is not monitoring Blood Pressure at home.   Current complaints: none  Disposition:  patient to continue with the same medication, and await provider response. Routing to provider for review.    Linda Amaral RN  [facility]

## 2023-10-10 NOTE — TELEPHONE ENCOUNTER
Dorina Lee is a 48 year old year old patient who comes in today for a Blood Pressure check because of medication change. Switched from lisinopril to losartan 100mg daily on 9/25/23.     Vital Signs as repeated by RN: BP- 154/98, P- 60. Rechecked after 5 minutes: BP- 158/100.     Patient is taking medication as prescribed  Patient is tolerating medications well.  Patient is not monitoring Blood Pressure at home.   Current complaints: none  Disposition:  patient to continue with the same medication, and await provider response. Routing to provider for review.     Linda Amaral RN  [facility]   normal...

## 2023-10-25 ENCOUNTER — ALLIED HEALTH/NURSE VISIT (OUTPATIENT)
Dept: FAMILY MEDICINE | Facility: CLINIC | Age: 48
End: 2023-10-25
Payer: COMMERCIAL

## 2023-10-25 ENCOUNTER — TELEPHONE (OUTPATIENT)
Dept: FAMILY MEDICINE | Facility: CLINIC | Age: 48
End: 2023-10-25

## 2023-10-25 ENCOUNTER — LAB (OUTPATIENT)
Dept: LAB | Facility: CLINIC | Age: 48
End: 2023-10-25
Payer: COMMERCIAL

## 2023-10-25 VITALS
DIASTOLIC BLOOD PRESSURE: 78 MMHG | SYSTOLIC BLOOD PRESSURE: 124 MMHG | OXYGEN SATURATION: 99 % | HEART RATE: 84 BPM | RESPIRATION RATE: 16 BRPM

## 2023-10-25 DIAGNOSIS — I10 BENIGN ESSENTIAL HYPERTENSION: ICD-10-CM

## 2023-10-25 DIAGNOSIS — I10 BENIGN ESSENTIAL HYPERTENSION: Primary | ICD-10-CM

## 2023-10-25 LAB
ANION GAP SERPL CALCULATED.3IONS-SCNC: 10 MMOL/L (ref 7–15)
BUN SERPL-MCNC: 11.1 MG/DL (ref 6–20)
CALCIUM SERPL-MCNC: 9.4 MG/DL (ref 8.6–10)
CHLORIDE SERPL-SCNC: 99 MMOL/L (ref 98–107)
CREAT SERPL-MCNC: 0.85 MG/DL (ref 0.51–0.95)
DEPRECATED HCO3 PLAS-SCNC: 31 MMOL/L (ref 22–29)
EGFRCR SERPLBLD CKD-EPI 2021: 83 ML/MIN/1.73M2
GLUCOSE SERPL-MCNC: 93 MG/DL (ref 70–99)
POTASSIUM SERPL-SCNC: 3.3 MMOL/L (ref 3.4–5.3)
SODIUM SERPL-SCNC: 140 MMOL/L (ref 135–145)

## 2023-10-25 PROCEDURE — 36415 COLL VENOUS BLD VENIPUNCTURE: CPT

## 2023-10-25 PROCEDURE — 99207 PR NO CHARGE NURSE ONLY: CPT

## 2023-10-25 PROCEDURE — 80048 BASIC METABOLIC PNL TOTAL CA: CPT

## 2023-10-25 RX ORDER — HYDROCHLOROTHIAZIDE 12.5 MG/1
12.5 TABLET ORAL DAILY
Qty: 90 TABLET | Refills: 3 | Status: SHIPPED | OUTPATIENT
Start: 2023-10-25

## 2023-10-25 NOTE — PROGRESS NOTES
Dorina Lee is a 48 year old year old patient who comes in today for a Blood Pressure check because of new medication.  *RN BP check 10/10 & provider added hydrochlorothiazide 12.5mg daily. Pt also on losartan.  *pt had labs today    Vital Signs as repeated by RN   124/78 p84  122/78 p84    Patient is taking medication as prescribed  Patient is tolerating medications well.  Patient is monitoring Blood Pressure at home.   10/16: 145/89  10/18: 154/95  10/20: 155/87    Current complaints: none  Disposition:  routed to provider for review.    Devora Mercedes RN

## 2023-10-25 NOTE — TELEPHONE ENCOUNTER
Blood pressure is much improved.  Continue current medications at current doses.  Refill sent in.  Tamera Wick, CNP

## 2023-10-26 NOTE — TELEPHONE ENCOUNTER
Left message for patient to return call to clinic.   *also sending pt msg in [facility]    Devora Mercedes RN

## 2023-11-02 ENCOUNTER — TELEPHONE (OUTPATIENT)
Dept: FAMILY MEDICINE | Facility: CLINIC | Age: 48
End: 2023-11-02
Payer: COMMERCIAL

## 2023-11-02 NOTE — TELEPHONE ENCOUNTER
Patient Quality Outreach    Patient is due for the following:   Breast Cancer Screening - Mammogram    Next Steps:   Schedule a mammogram.    Type of outreach:    Sent letter.      Questions for provider review:    None           Jessenia Lopez, [facility]

## 2023-11-02 NOTE — LETTER
November 2, 2023    To  Dorina Lee  [address]  [place] [address]    Your team at [facility] cares about your health. We have reviewed your chart and based on our findings; we are making the following recommendations to better manage your health.     You are in particular need of attention regarding the following:     Schedule Annual MAMMOGRAPHY. [facility] scheduling number is [phone] or schedule in [employer] (self referral).  1 in 8 women will develop invasive breast cancer during her lifetime and it is the most common non-skin cancer in [place] Women. EARLY detection, new treatments, and a better understanding of the disease have increased survival rates- the 5 year survival rate in the 1960's was 63% and today it is close to 90%.  If you are under/uninsured, we recommend you contact the [facility]. They offer mammograms at no charge or on a sliding fee charge. You can schedule with them at [phone]. Please have them send us the results.     If you have already completed these items, please contact the clinic via phone or   [employer] so your care team can review and update your records. Thank you for   choosing [facility] for your healthcare needs. For any questions,   concerns, or to schedule an appointment please contact our clinic.    Healthy Regards,      Your [facility] Care Team

## 2023-11-26 ENCOUNTER — HEALTH MAINTENANCE LETTER (OUTPATIENT)
Age: 48
End: 2023-11-26

## 2024-02-12 ENCOUNTER — TELEPHONE (OUTPATIENT)
Dept: FAMILY MEDICINE | Facility: CLINIC | Age: 49
End: 2024-02-12
Payer: COMMERCIAL

## 2024-02-12 NOTE — LETTER
February 12, 2024      Dorina Lee  [address] [place] [address]        Dear Dorina,       Your team at [facility] cares about your health. We have reviewed your chart and based on our findings; we are making the following recommendations to better manage your health.     You are in particular need of attention regarding the following:     Schedule Annual MAMMOGRAPHY. [facility] scheduling number is [phone] or schedule in MyChart (self referral).    If you have already completed these items, please contact the clinic via phone or   S[employer] so your care team can review and update your records. Thank you for   choosing [facility] for your healthcare needs. For any questions,   concerns, or to schedule an appointment please contact our clinic.          Sincerely,        [facility]

## 2024-02-12 NOTE — TELEPHONE ENCOUNTER
Patient Quality Outreach    Patient is due for the following:   Breast Cancer Screening - Mammogram    Next Steps:   Due for mammogram     Type of outreach:    Sent [employer] message.      Questions for provider review:    None           Phi Rutledge, CMA

## 2024-03-08 ENCOUNTER — PATIENT OUTREACH (OUTPATIENT)
Dept: CARE COORDINATION | Facility: CLINIC | Age: 49
End: 2024-03-08
Payer: COMMERCIAL

## 2024-05-13 ENCOUNTER — TELEPHONE (OUTPATIENT)
Dept: FAMILY MEDICINE | Facility: CLINIC | Age: 49
End: 2024-05-13
Payer: COMMERCIAL

## 2024-05-13 NOTE — TELEPHONE ENCOUNTER
Patient Quality Outreach    Patient is due for the following:   Breast Cancer Screening - Mammogram  Physical Preventive Adult Physical    Next Steps:   Schedule a Adult Preventative    Type of outreach:    Sent [employer] message.      Questions for provider review:    None           Phi Rutledge, CMA

## 2024-06-23 ENCOUNTER — HEALTH MAINTENANCE LETTER (OUTPATIENT)
Age: 49
End: 2024-06-23

## 2024-07-21 NOTE — PATIENT INSTRUCTIONS
Use cream twice daily for 14 days.  Follow up if no improvement.      Thank you for choosing [facility].  You may be receiving an email and/or telephone survey request from [facility] Customer Experience regarding your visit today.  Please take a few minutes to respond to the survey to let us know how we are doing.      If you have questions or concerns, please contact us via [employer]ment or you can contact your care team at [phone].    Our Clinic hours are:  Monday 6:40 am  to 7:00 pm  Tuesday -Friday 6:40 am to 5:00 pm    The [place] outpatient lab hours are:  Monday - Friday 6:10 am to 4:45 pm  Saturdays 7:00 am to 11:00 am  Appointments are required, call [phone]    If you have clinical questions after hours or would like to schedule an appointment,  call the clinic at [phone].     21-Jul-2024 22:10

## 2024-08-12 ENCOUNTER — TELEPHONE (OUTPATIENT)
Dept: FAMILY MEDICINE | Facility: CLINIC | Age: 49
End: 2024-08-12
Payer: COMMERCIAL

## 2024-08-12 NOTE — TELEPHONE ENCOUNTER
Patient Quality Outreach    Patient is due for the following:   Breast Cancer Screening - Mammogram  Physical Preventive Adult Physical    Next Steps:   Schedule a Adult Preventative    Type of outreach:    Sent [employer] message.      Questions for provider review:    None           Phi Rutledge, CMA

## 2024-10-04 ENCOUNTER — PATIENT OUTREACH (OUTPATIENT)
Dept: CARE COORDINATION | Facility: CLINIC | Age: 49
End: 2024-10-04
Payer: COMMERCIAL

## 2025-01-04 ENCOUNTER — HEALTH MAINTENANCE LETTER (OUTPATIENT)
Age: 50
End: 2025-01-04

## 2025-01-28 DIAGNOSIS — I10 BENIGN ESSENTIAL HYPERTENSION: ICD-10-CM

## 2025-01-28 RX ORDER — HYDROCHLOROTHIAZIDE 12.5 MG/1
12.5 TABLET ORAL DAILY
Qty: 90 TABLET | Refills: 3 | OUTPATIENT
Start: 2025-01-28

## 2025-01-28 NOTE — TELEPHONE ENCOUNTER
Requested Prescriptions   Pending Prescriptions Disp Refills    hydroCHLOROthiazide 12.5 MG tablet [Pharmacy Med Name: HYDROCHLOROTHIAZIDE 12.5MG TABS] 90 tablet 3     Sig: Take 1 tablet (12.5 mg) by mouth daily       Diuretics (Including Combos) Protocol Failed - 1/28/2025  2:31 PM        Failed - Most recent blood pressure under 140/90 in past 12 months     BP Readings from Last 3 Encounters:   10/25/23 124/78   10/10/23 (!) 158/100   06/11/23 (!) 154/90       No data recorded            Failed - Potassium level on file in past 12 months        Failed - Has GFR on file in past 12 months and most recent value is normal        Failed - Recent (12 mo) or future (90 days) visit within the authorizing provider's specialty     The patient must have completed an in-person or virtual visit within the past 12 months or has a future visit scheduled within the next 90 days with the authorizing provider s specialty.  Urgent care and e-visits do not qualify as an office visit for this protocol.          Passed - Medication is active on med list        Passed - Medication indicated for associated diagnosis     Medication is associated with one or more of the following diagnoses:     Edema   Hypertension   Heart Failure   Meniere's Disease   Bilateral localized swelling of lower limbs   Pulmonary Hypertension          Passed - Patient is age 18 or older        Passed - No active pregancy on record        Passed - No positive pregnancy test in past 12 months

## 2025-03-05 ENCOUNTER — OFFICE VISIT (OUTPATIENT)
Dept: FAMILY MEDICINE | Facility: CLINIC | Age: 50
End: 2025-03-05
Payer: COMMERCIAL

## 2025-03-05 VITALS
SYSTOLIC BLOOD PRESSURE: 144 MMHG | HEART RATE: 82 BPM | OXYGEN SATURATION: 97 % | DIASTOLIC BLOOD PRESSURE: 90 MMHG | TEMPERATURE: 97.8 F | BODY MASS INDEX: 39.79 KG/M2 | RESPIRATION RATE: 12 BRPM | WEIGHT: 238.8 LBS | HEIGHT: 65 IN

## 2025-03-05 DIAGNOSIS — M79.89 LEG SWELLING: ICD-10-CM

## 2025-03-05 DIAGNOSIS — Z12.31 VISIT FOR SCREENING MAMMOGRAM: ICD-10-CM

## 2025-03-05 DIAGNOSIS — E66.01 MORBID OBESITY (H): ICD-10-CM

## 2025-03-05 DIAGNOSIS — R06.09 DOE (DYSPNEA ON EXERTION): ICD-10-CM

## 2025-03-05 DIAGNOSIS — Z00.00 ROUTINE GENERAL MEDICAL EXAMINATION AT A HEALTH CARE FACILITY: Primary | ICD-10-CM

## 2025-03-05 DIAGNOSIS — Z13.220 LIPID SCREENING: ICD-10-CM

## 2025-03-05 DIAGNOSIS — H93.8X9 SENSATION OF FULLNESS IN EAR, UNSPECIFIED LATERALITY: ICD-10-CM

## 2025-03-05 DIAGNOSIS — I10 BENIGN ESSENTIAL HYPERTENSION: ICD-10-CM

## 2025-03-05 LAB
ANION GAP SERPL CALCULATED.3IONS-SCNC: 10 MMOL/L (ref 7–15)
BUN SERPL-MCNC: 13.6 MG/DL (ref 6–20)
CALCIUM SERPL-MCNC: 9.5 MG/DL (ref 8.8–10.4)
CHLORIDE SERPL-SCNC: 108 MMOL/L (ref 98–107)
CHOLEST SERPL-MCNC: 220 MG/DL
CREAT SERPL-MCNC: 0.78 MG/DL (ref 0.51–0.95)
EGFRCR SERPLBLD CKD-EPI 2021: >90 ML/MIN/1.73M2
FASTING STATUS PATIENT QL REPORTED: NO
FASTING STATUS PATIENT QL REPORTED: NO
GLUCOSE SERPL-MCNC: 92 MG/DL (ref 70–99)
HCO3 SERPL-SCNC: 24 MMOL/L (ref 22–29)
HDLC SERPL-MCNC: 54 MG/DL
LDLC SERPL CALC-MCNC: 148 MG/DL
NONHDLC SERPL-MCNC: 166 MG/DL
POTASSIUM SERPL-SCNC: 3.9 MMOL/L (ref 3.4–5.3)
SODIUM SERPL-SCNC: 142 MMOL/L (ref 135–145)
TRIGL SERPL-MCNC: 91 MG/DL

## 2025-03-05 PROCEDURE — 36415 COLL VENOUS BLD VENIPUNCTURE: CPT | Performed by: STUDENT IN AN ORGANIZED HEALTH CARE EDUCATION/TRAINING PROGRAM

## 2025-03-05 PROCEDURE — 80061 LIPID PANEL: CPT | Performed by: STUDENT IN AN ORGANIZED HEALTH CARE EDUCATION/TRAINING PROGRAM

## 2025-03-05 PROCEDURE — 80048 BASIC METABOLIC PNL TOTAL CA: CPT | Performed by: STUDENT IN AN ORGANIZED HEALTH CARE EDUCATION/TRAINING PROGRAM

## 2025-03-05 RX ORDER — HYDROCHLOROTHIAZIDE 12.5 MG/1
12.5 TABLET ORAL DAILY
Qty: 90 TABLET | Refills: 3 | Status: CANCELLED | OUTPATIENT
Start: 2025-03-05

## 2025-03-05 RX ORDER — LOSARTAN POTASSIUM AND HYDROCHLOROTHIAZIDE 25; 100 MG/1; MG/1
1 TABLET ORAL DAILY
Status: CANCELLED | OUTPATIENT
Start: 2025-03-05

## 2025-03-05 RX ORDER — LOSARTAN POTASSIUM AND HYDROCHLOROTHIAZIDE 12.5; 1 MG/1; MG/1
1 TABLET ORAL DAILY
Qty: 90 TABLET | Refills: 0 | Status: SHIPPED | OUTPATIENT
Start: 2025-03-05

## 2025-03-05 RX ORDER — LOSARTAN POTASSIUM 100 MG/1
100 TABLET ORAL DAILY
Qty: 90 TABLET | Refills: 0 | Status: CANCELLED | OUTPATIENT
Start: 2025-03-05

## 2025-03-05 SDOH — HEALTH STABILITY: PHYSICAL HEALTH: ON AVERAGE, HOW MANY DAYS PER WEEK DO YOU ENGAGE IN MODERATE TO STRENUOUS EXERCISE (LIKE A BRISK WALK)?: 1 DAY

## 2025-03-05 SDOH — HEALTH STABILITY: PHYSICAL HEALTH: ON AVERAGE, HOW MANY MINUTES DO YOU ENGAGE IN EXERCISE AT THIS LEVEL?: 10 MIN

## 2025-03-05 ASSESSMENT — SOCIAL DETERMINANTS OF HEALTH (SDOH): HOW OFTEN DO YOU GET TOGETHER WITH FRIENDS OR RELATIVES?: TWICE A WEEK

## 2025-03-05 ASSESSMENT — PAIN SCALES - GENERAL: PAINLEVEL_OUTOF10: NO PAIN (0)

## 2025-03-05 NOTE — RESULT ENCOUNTER NOTE
Carlos Lee,     It is a pleasure providing you with medical care. I have received and reviewed your results, and have the following recommendations:     Based on the results of your blood work, your basic metabolic panel for the most part was within normal limits.  You had a mildly elevated chloride level of 108, this is not clinically significant and does not require any additional workup or interventions.    The results of your cholesterol panel show that you do have an elevated total cholesterol as well as elevated LDL (lousy) cholesterol.  At this time no medication changes are needed, however to protect yourself from future heart attack or stroke it is important that you: Try to limit your dietary intake of fatty, greasy, oily, fried, take out, and fast food when possible. Also, I would suggest you cut back on red and processed meats, sodium and sugar-sweetened foods and beverages. Regarding exercise, please get at least 30 minutes of CONTINUOUS moderate intensity aerobic exercise at least 3 times per week.    Sincerely,     Minerva Bañuelos MD

## 2025-03-05 NOTE — PATIENT INSTRUCTIONS
Hello! It was a pleasure seeing you today. Just some things we discussed at today's visit:     Ear Fullness    You can trial a 3 day course of Claritin-D or Allegra-D and then switch to regular Claritin or allegra after 3 days   Please use daily Flonase, this can take up to 4 weeks for full effects to be noted   Referral to ENT placed   Leg Swelling   When you get home keep your legs elevated above your waist for at least 15-20 min daily   Consider investing in compression socks   Make sure you are getting at least 30 minutes of continuous walking 3x per week   Please call [phone] to schedule an appointment for your  Echo/Heart ultrasound       Sincerely,     Minerva Bañuelos MD          Patient Education   Preventive Care Advice   This is general advice given by our system to help you stay healthy. However, your care team may have specific advice just for you. Please talk to your care team about your preventive care needs.  Nutrition  Eat 5 or more servings of fruits and vegetables each day.  Try wheat bread, brown rice and whole grain pasta (instead of white bread, rice, and pasta).  Get enough calcium and vitamin D. Check the label on foods and aim for 100% of the RDA (recommended daily allowance).  Lifestyle  Exercise at least 150 minutes each week  (30 minutes a day, 5 days a week).  Do muscle strengthening activities 2 days a week. These help control your weight and prevent disease.  No smoking.  Wear sunscreen to prevent skin cancer.  Have a dental exam and cleaning every 6 months.  Yearly exams  See your health care team every year to talk about:  Any changes in your health.  Any medicines your care team has prescribed.  Preventive care, family planning, and ways to prevent chronic diseases.  Shots (vaccines)   HPV shots (up to age 26), if you've never had them before.  Hepatitis B shots (up to age 59), if you've never had them before.  COVID-19 shot: Get this shot when it's due.  Flu shot: Get a flu shot  every year.  Tetanus shot: Get a tetanus shot every 10 years.  Pneumococcal, hepatitis A, and RSV shots: Ask your care team if you need these based on your risk.  Shingles shot (for age 50 and up)  General health tests  Diabetes screening:  Starting at age 35, Get screened for diabetes at least every 3 years.  If you are younger than age 35, ask your care team if you should be screened for diabetes.  Cholesterol test: At age 39, start having a cholesterol test every 5 years, or more often if advised.  Bone density scan (DEXA): At age 50, ask your care team if you should have this scan for osteoporosis (brittle bones).  Hepatitis C: Get tested at least once in your life.  STIs (sexually transmitted infections)  Before age 24: Ask your care team if you should be screened for STIs.  After age 24: Get screened for STIs if you're at risk. You are at risk for STIs (including HIV) if:  You are sexually active with more than one person.  You don't use condoms every time.  You or a partner was diagnosed with a sexually transmitted infection.  If you are at risk for HIV, ask about PrEP medicine to prevent HIV.  Get tested for HIV at least once in your life, whether you are at risk for HIV or not.  Cancer screening tests  Cervical cancer screening: If you have a cervix, begin getting regular cervical cancer screening tests starting at age 21.  Breast cancer scan (mammogram): If you've ever had breasts, begin having regular mammograms starting at age 40. This is a scan to check for breast cancer.  Colon cancer screening: It is important to start screening for colon cancer at age 45.  Have a colonoscopy test every 10 years (or more often if you're at risk) Or, ask your provider about stool tests like a FIT test every year or Cologuard test every 3 years.  To learn more about your testing options, visit:   .  For help making a decision, visit:   https://bit.ly/ga21198.  Prostate cancer screening test: If you have a prostate, ask  your care team if a prostate cancer screening test (PSA) at age 55 is right for you.  Lung cancer screening: If you are a current or former smoker ages 50 to 80, ask your care team if ongoing lung cancer screenings are right for you.  For informational purposes only. Not to replace the advice of your health care provider. Copyright   2023 [facility]. All rights reserved. Clinically reviewed by the [facility]. [employer] [id number] - REV 01/24.  Learning About Stress  What is stress?     Stress is your body's response to a hard situation. Your body can have a physical, emotional, or mental response. Stress is a fact of life for most people, and it affects everyone differently. What causes stress for you may not be stressful for someone else.  A lot of things can cause stress. You may feel stress when you go on a job interview, take a test, or run a race. This kind of short-term stress is normal and even useful. It can help you if you need to work hard or react quickly. For example, stress can help you finish an important job on time.  Long-term stress is caused by ongoing stressful situations or events. Examples of long-term stress include long-term health problems, ongoing problems at work, or conflicts in your family. Long-term stress can harm your health.  How does stress affect your health?  When you are stressed, your body responds as though you are in danger. It makes hormones that speed up your heart, make you breathe faster, and give you a burst of energy. This is called the fight-or-flight stress response. If the stress is over quickly, your body goes back to normal and no harm is done.  But if stress happens too often or lasts too long, it can have bad effects. Long-term stress can make you more likely to get sick, and it can make symptoms of some diseases worse. If you tense up when you are stressed, you may develop neck, shoulder, or low back pain. Stress is linked to  high blood pressure and heart disease.  Stress also harms your emotional health. It can make you brink, tense, or depressed. Your relationships may suffer, and you may not do well at work or school.  What can you do to manage stress?  You can try these things to help manage stress:   Do something active. Exercise or activity can help reduce stress. Walking is a great way to get started. Even everyday activities such as housecleaning or yard work can help.  Try yoga or boyd chi. These techniques combine exercise and meditation. You may need some training at first to learn them.  Do something you enjoy. For example, listen to music or go to a movie. Practice your hobby or do volunteer work.  Meditate. This can help you relax, because you are not worrying about what happened before or what may happen in the future.  Do guided imagery. Imagine yourself in any setting that helps you feel calm. You can use online videos, books, or a teacher to guide you.  Do breathing exercises. For example:  From a standing position, bend forward from the waist with your knees slightly bent. Let your arms dangle close to the floor.  Breathe in slowly and deeply as you return to a standing position. Roll up slowly and lift your head last.  Hold your breath for just a few seconds in the standing position.  Breathe out slowly and bend forward from the waist.  Let your feelings out. Talk, laugh, cry, and express anger when you need to. Talking with supportive friends or family, a counselor, or a rajiv leader about your feelings is a healthy way to relieve stress. Avoid discussing your feelings with people who make you feel worse.  Write. It may help to write about things that are bothering you. This helps you find out how much stress you feel and what is causing it. When you know this, you can find better ways to cope.  What can you do to prevent stress?  You might try some of these things to help prevent stress:  Manage your time. This helps  "you find time to do the things you want and need to do.  Get enough sleep. Your body recovers from the stresses of the day while you are sleeping.  Get support. Your family, friends, and community can make a difference in how you experience stress.  Limit your news feed. Avoid or limit time on social media or news that may make you feel stressed.  Do something active. Exercise or activity can help reduce stress. Walking is a great way to get started.  Where can you learn more?  Go to https://www.Eved.net/patiented  Enter N032 in the search box to learn more about \"Learning About Stress.\"  Current as of: October 24, 2023  Content Version: 14.3    2024 [email or web address].   Care instructions adapted under license by your healthcare professional. If you have questions about a medical condition or this instruction, always ask your healthcare professional. [email or web address] disclaims any warranty or liability for your use of this information.       "

## 2025-03-05 NOTE — PROGRESS NOTES
Preventive Care Visit  [facility]  LISA EMANUEL MD, Family Medicine  Mar 5, 2025      Assessment & Plan     Routine general medical examination at a health care facility  Visit for screening mammogram  - MA Screening Bilateral w/ Nathan; Future    Lipid screening  - Lipid panel reflex to direct LDL Fasting; Future  - Lipid panel reflex to direct LDL Fasting    Ear fullness, unspecified laterally  - Adult ENT  Referral; Future  -Patient aware that she can try a 3-day course of Claritin-D or Allegra-D (would like to limit use of pseudoephedrine when able given her history of high blood pressure)  -After completing course of Claritin/Allegra-D, the patient can switch to regular Claritin or allegra   -Also encouraged the use of daily Flonase    Morbid obesity (H)  Benign essential hypertension, currently not at goal of less than 140/90,   > this is likely in the setting of medication noncompliance as the patient had been out of her hydrochlorothiazide for the past month  -Patient was previously prescribed 12.5 mg of hydrochlorothiazide and 100 mg of losartan separately, will send a new prescription for Hyzaar to help with medication compliance  - losartan-hydrochlorothiazide (HYZAAR) 100-12.5 MG tablet; Take 1 tablet by mouth daily.  - BASIC METABOLIC PANEL    Leg swelling  BETANCUR (dyspnea on exertion)  > Suspect secondary to venous stasis, however given the patient has been endorsing worsening dyspnea on exertion we will also order echocardiogram  - Echocardiogram Complete; Future    Patient has been advised of split billing requirements and indicates understanding: Yes    The longitudinal plan of care for the diagnosis(es)/condition(s) as documented were addressed during this visit. Due to the added complexity in care, I will continue to support Dorina in the subsequent management and with ongoing continuity of care.      BMI  Estimated body mass index is 40.05 kg/m  as calculated  "from the following:    Height as of this encounter: 1.645 m (5' 4.75\").    Weight as of this encounter: 108.3 kg (238 lb 12.8 oz).       Counseling  Appropriate preventive services were addressed with this patient via screening, questionnaire, or discussion as appropriate for fall prevention, nutrition, physical activity, Tobacco-use cessation, social engagement, weight loss and cognition.  Checklist reviewing preventive services available has been given to the patient.  She is at risk for lack of exercise and has been provided with information to increase physical activity for the benefit of her well-being.         Ann Cam is a 49 year old, presenting for the following:  Physical, Ear Problem (Fluid behind ear that causes vertigo. Would like referral to ENT.), and Leg Swelling        3/5/2025     9:08 AM   Additional Questions   Roomed by Marian Nascimento   Accompanied by self          HPI       Hypertension Follow-up    Do you check your blood pressure regularly outside of the clinic? No not on a regular basis  Are you following a low salt diet? No  Are your blood pressures ever more than 140 on the top number (systolic) OR more   than 90 on the bottom number (diastolic), for example 140/90? Yes since being out of medication. When on current medications there is no issue with blood pressure. Reports she has been out of her hydrochlorothiazide for the past 30 days but she still has her losartan and has been taking that prescribed     Concern - Fluid behind ear   Onset: Noticed about a year and a half ago, feels like her ears are plugged   Description: Pressure behind ear like needs to pop however it is causing vertigo and an amplifying in sound. Can no longer Pop it herself as previously recommended.  Intensity: moderate  Progression of Symptoms:  worsening  Accompanying Signs & Symptoms: Vertigo, pressure in ears, Occasional pain  Previous history of similar problem: No  Precipitating factors:        " Worsened by: No  Alleviating factors:        Improved by: no  Therapies tried and outcome: Self Popping ears.   She had been seen in the ER 2-3 years ago for severe vertigo that was resulting in episodes of emesis, previously she was able to pop her ear, but lately she has noticed that it is harder for her to pop her ear, she is talking more loudly because she can't hear herself as well   Denies any history of allergies       Concern - Leg swelling  Onset: 6 months since getting worse  Description: Leg swelling in both legs to the point feet are a bit more swollen causing issues in putting on shoes. No pain associated.  Intensity: mild  Progression of Symptoms:  worsening  Previous history of similar problem: Yes but a more increase since 6 months ago.   Precipitating factors:        Worsened by: No  Alleviating factors:        Improved by: No  Therapies tried and outcome: None  Currently denies any shortness of breath, palpitations, or chest pain associated with her leg symptoms but admits that when she exerts herself she feels more out of breath than she used to in the past   Works as a    Denies any long plane or car rides, no long hours of being sedentary     Advance Care Planning  Patient does not have a Health Care Directive: Discussed advance care planning with patient; information given to patient to review.      3/5/2025   General Health   How would you rate your overall physical health? (!) FAIR   Feel stress (tense, anxious, or unable to sleep) Very much   (!) STRESS CONCERN      3/5/2025   Nutrition   Three or more servings of calcium each day? (!) NO   Diet: Regular (no restrictions)   How many servings of fruit and vegetables per day? (!) 0-1   How many sweetened beverages each day? 0-1         3/5/2025   Exercise   Days per week of moderate/strenous exercise 1 day   Average minutes spent exercising at this level 10 min   (!) EXERCISE CONCERN      3/5/2025   Social Factors   Frequency  of gathering with friends or relatives Twice a week   Worry food won't last until get money to buy more No   Food not last or not have enough money for food? No   Do you have housing? (Housing is defined as stable permanent housing and does not include staying ouside in a car, in a tent, in an abandoned building, in an overnight shelter, or couch-surfing.) Yes   Are you worried about losing your housing? No   Lack of transportation? No   Unable to get utilities (heat,electricity)? No         3/5/2025   Fall Risk   Fallen 2 or more times in the past year? No   Trouble with walking or balance? No          3/5/2025   Dental   Dentist two times every year? (!) NO            Today's PHQ-2 Score:       3/5/2025     8:59 AM   PHQ-2 (  [employer])   Q1: Little interest or pleasure in doing things 1   Q2: Feeling down, depressed or hopeless 1   PHQ-2 Score 2    Q1: Little interest or pleasure in doing things Several days   Q2: Feeling down, depressed or hopeless Several days   PHQ-2 Score 2       Patient-reported           3/5/2025   Substance Use   Alcohol more than 3/day or more than 7/wk Not Applicable   Do you use any other substances recreationally? No     Social History     Tobacco Use    Smoking status: Former     Current packs/day: 0.00     Types: Cigarettes     Quit date: 2007     Years since quittin.1    Smokeless tobacco: Never    Tobacco comments:     smoked for 10 years   Vaping Use    Vaping status: Never Used   Substance Use Topics    Alcohol use: Yes     Comment: one drink every 6 months    Drug use: No          Mammogram Screening - Mammogram every 1-2 years updated in Health Maintenance based on mutual decision making        3/5/2025   STI Screening   New sexual partner(s) since last STI/HIV test? No     History of abnormal Pap smear:         Latest Ref Rng & Units 2023     1:58 PM   PAP / HPV   PAP  Negative for Intraepithelial Lesion or Malignancy (NILM)    HPV 16 DNA Negative Negative    HPV  "18 DNA Negative Negative    Other HR HPV Negative Negative      ASCVD Risk   The 10-year ASCVD risk score (Kathy RENAE, et al., 2019) is: 3.1%    Values used to calculate the score:      Age: 49 years      Sex: Female      Is Non- : No      Diabetic: No      Tobacco smoker: No      Systolic Blood Pressure: 144 mmHg      Is BP treated: Yes      HDL Cholesterol: 51 mg/dL      Total Cholesterol: 233 mg/dL         Reviewed and updated as needed this visit by Provider                    Review of Systems   Constitutional:  Negative for chills and fever.   HENT:  Positive for ear fullness    Eyes:  Negative for pain.   Respiratory:  Negative for cough.    Cardiovascular:  Negative for chest pain.   Gastrointestinal:  Negative for abdominal pain.   Genitourinary:  Negative for dysuria.   Musculoskeletal:  Negative for neck pain.   Skin:  Negative for rash.   Neurological:  Negative for headaches.          Objective    Exam  BP (!) 144/90 (BP Location: Right arm, Patient Position: Sitting, Cuff Size: Adult Large)   Pulse 82   Temp 97.8  F (36.6  C) (Tympanic)   Resp 12   Ht 1.645 m (5' 4.75\")   Wt 108.3 kg (238 lb 12.8 oz)   LMP 02/03/2025 (Approximate)   SpO2 97%   BMI 40.05 kg/m     Estimated body mass index is 40.05 kg/m  as calculated from the following:    Height as of this encounter: 1.645 m (5' 4.75\").    Weight as of this encounter: 108.3 kg (238 lb 12.8 oz).    Physical Exam  Constitutional:       General: She is not in acute distress.  HENT:      Head: Normocephalic and atraumatic.      Right Ear: External ear normal.      Left Ear: External ear normal.      Mouth/Throat:      Mouth: Mucous membranes are moist.      Pharynx: Oropharynx is clear. No oropharyngeal exudate or posterior oropharyngeal erythema.   Eyes:      Extraocular Movements: Extraocular movements intact.   Cardiovascular:      Rate and Rhythm: Normal rate and regular rhythm.      Heart sounds: Normal heart " sounds.   Pulmonary:      Effort: Pulmonary effort is normal. No respiratory distress.      Breath sounds: Normal breath sounds. No wheezing or rhonchi.   Abdominal:      Palpations: Abdomen is soft. There is no mass.      Tenderness: There is no abdominal tenderness.   Musculoskeletal:         General: No deformity. Normal range of motion.      Cervical back: Normal range of motion and neck supple.      Comments: No noted pitting edema bilaterally   Skin:     General: Skin is warm.      Findings: No rash.   Neurological:      General: No focal deficit present.      Mental Status: She is alert and oriented to person, place, and time.   Psychiatric:         Mood and Affect: Mood normal.         Signed Electronically by: LISA EMANUEL MD

## 2025-03-06 ENCOUNTER — PATIENT OUTREACH (OUTPATIENT)
Dept: CARE COORDINATION | Facility: CLINIC | Age: 50
End: 2025-03-06
Payer: COMMERCIAL

## 2025-03-18 ENCOUNTER — TELEPHONE (OUTPATIENT)
Dept: FAMILY MEDICINE | Facility: CLINIC | Age: 50
End: 2025-03-18
Payer: COMMERCIAL

## 2025-03-18 NOTE — LETTER
March 18, 2025    To  Dorina Lee  [id number] [facility] [id number]    Your team at [facility] cares about your health. We have reviewed your chart and based on our findings; we are making the following recommendations to better manage your health.     You are in particular need of attention regarding the following:     Schedule Annual MAMMOGRAPHY. [facility] scheduling number is [phone] or schedule in [facility] (self referral).  1 in 8 women will develop invasive breast cancer during her lifetime and it is the most common non-skin cancer in [place] Women. EARLY detection, new treatments, and a better understanding of the disease have increased survival rates- the 5 year survival rate in the 1960's was 63% and today it is close to 90%.    If you have already completed these items, please contact the clinic via phone or   [facility] so your care team can review and update your records. Thank you for   choosing [facility] for your healthcare needs. For any questions,   concerns, or to schedule an appointment please contact our clinic.    Healthy Regards,      Your [facility] Care Team            Electronically signed

## 2025-03-18 NOTE — TELEPHONE ENCOUNTER
Patient Quality Outreach    Patient is due for the following:   Breast Cancer Screening - Mammogram    Action(s) Taken:   Schedule a mammogram    Type of outreach:    Sent Jeeran message.    Questions for provider review:    None           Jessenia Lopez, [facility]

## 2025-03-19 ENCOUNTER — ALLIED HEALTH/NURSE VISIT (OUTPATIENT)
Dept: FAMILY MEDICINE | Facility: CLINIC | Age: 50
End: 2025-03-19
Payer: COMMERCIAL

## 2025-03-19 ENCOUNTER — TELEPHONE (OUTPATIENT)
Dept: FAMILY MEDICINE | Facility: CLINIC | Age: 50
End: 2025-03-19

## 2025-03-19 VITALS — SYSTOLIC BLOOD PRESSURE: 132 MMHG | HEART RATE: 68 BPM | OXYGEN SATURATION: 99 % | DIASTOLIC BLOOD PRESSURE: 84 MMHG

## 2025-03-19 DIAGNOSIS — I10 BENIGN ESSENTIAL HYPERTENSION: Primary | ICD-10-CM

## 2025-03-19 PROCEDURE — 99207 PR NO CHARGE NURSE ONLY: CPT

## 2025-03-19 PROCEDURE — 3079F DIAST BP 80-89 MM HG: CPT

## 2025-03-19 PROCEDURE — 3075F SYST BP GE 130 - 139MM HG: CPT

## 2025-03-19 RX ORDER — LOSARTAN POTASSIUM AND HYDROCHLOROTHIAZIDE 25; 100 MG/1; MG/1
1 TABLET ORAL DAILY
Qty: 90 TABLET | Refills: 3 | Status: SHIPPED | OUTPATIENT
Start: 2025-03-19

## 2025-03-19 NOTE — TELEPHONE ENCOUNTER
Left message for patient to call clinic back. When patient calls clinic back please give Dr. Bañuelos's message caesar..    Mary Mercedes MA

## 2025-03-19 NOTE — PROGRESS NOTES
Dorina Lee is a 49 year old year old patient who comes in today for a Blood Pressure check because of ongoing blood pressure monitoring.    Vital Signs as repeated by /84 p 68 132/84    Patient is taking medication as prescribed-does forget once and a while.( Approx q 3 weeks)    Patient is tolerating medications well.    Patient is monitoring Blood Pressure at home.  Average readings if yes are yesterday pt checked and was 160/101, 165/84. Last week was 136/81    Current complaints: none    Disposition:  patient to continue with the same medication and await provider response.       Ridge Iraheta RN

## 2025-03-20 ENCOUNTER — HOSPITAL ENCOUNTER (OUTPATIENT)
Dept: CARDIOLOGY | Facility: CLINIC | Age: 50
Discharge: HOME | End: 2025-03-20
Attending: STUDENT IN AN ORGANIZED HEALTH CARE EDUCATION/TRAINING PROGRAM
Payer: COMMERCIAL

## 2025-03-20 DIAGNOSIS — M79.89 LEG SWELLING: ICD-10-CM

## 2025-03-20 DIAGNOSIS — R06.09 DOE (DYSPNEA ON EXERTION): ICD-10-CM

## 2025-03-20 LAB — LVEF ECHO: NORMAL

## 2025-03-20 PROCEDURE — 93306 TTE W/DOPPLER COMPLETE: CPT

## 2025-03-20 NOTE — TELEPHONE ENCOUNTER
Left message for pt to call back. Needing to give her 's message. Also Sent Pt MyChart Message with PCP's note.     .Chapis Sherwood PSC

## 2025-04-07 ENCOUNTER — ALLIED HEALTH/NURSE VISIT (OUTPATIENT)
Dept: FAMILY MEDICINE | Facility: CLINIC | Age: 50
End: 2025-04-07
Payer: COMMERCIAL

## 2025-04-07 ENCOUNTER — TELEPHONE (OUTPATIENT)
Dept: FAMILY MEDICINE | Facility: CLINIC | Age: 50
End: 2025-04-07

## 2025-04-07 VITALS — SYSTOLIC BLOOD PRESSURE: 126 MMHG | DIASTOLIC BLOOD PRESSURE: 80 MMHG | HEART RATE: 76 BPM

## 2025-04-07 DIAGNOSIS — I10 BENIGN ESSENTIAL HYPERTENSION: Primary | ICD-10-CM

## 2025-04-07 PROCEDURE — 3079F DIAST BP 80-89 MM HG: CPT

## 2025-04-07 PROCEDURE — 99207 PR NO CHARGE NURSE ONLY: CPT

## 2025-04-07 PROCEDURE — 3074F SYST BP LT 130 MM HG: CPT

## 2025-04-07 NOTE — TELEPHONE ENCOUNTER
Forwarding normal RN BP check to provider as FYI.    Then please see last paragraph below regarding follow-up question to echo and advise.      Dorina Lee is a 50 year old patient who comes in today for a Blood Pressure check because of medication change.  Hydrochlorothiazide was increased from 12.5 mg to 25 mg.   Vital Signs as repeated by RN today:  /80; Pulse 76.  Patient is taking medication as prescribed.  Patient is tolerating medications well.  Patient is monitoring Blood Pressure at home occasionally.  Results range from 119-151/69-89.  The systolic 151 was an outlier and the one day she forgot to take her pill, the other readings are within range.   Current complaints: none  Disposition:  patient to continue with the same medication.  She was advised to continue increasing her activity, watch diet and check BP, reach out for patterns >140/90.   Understanding voiced.    Patient notes she would also like a bit more information regarding her recent echocardiogram, which was largely normal/expected range, but there was some regurgitation noted.    Is there any monitoring that needs to occur with this or things to watch for going forward?     Thanks,  Janene Padilla RN  [facility]

## 2025-04-07 NOTE — PROGRESS NOTES
Dorina Lee is a 50 year old patient who comes in today for a Blood Pressure check because of medication change.  Hydrochlorothiazide was increased from 12.5 mg to 25 mg.   Vital Signs as repeated by RN today:  /80; Pulse 76.  Patient is taking medication as prescribed.  Patient is tolerating medications well.  Patient is monitoring Blood Pressure at home occasionally.  Results range from 119-151/69-89.  The systolic 151 was an outlier and the one day she forgot to take her pill, the other readings are within range.   Current complaints: none  Disposition:  patient to continue with the same medication.  She was advised to continue increasing her activity, watch diet and check BP, reach out for patterns >140/90.   Understanding voiced.    Patient notes she would also like a bit more information regarding her recent echocardiogram, which was largely normal/expected range, but there was some regurgitation noted.    Is there any monitoring that needs to occur with this or things to watch for going forward?     Janene Padilla RN  [facility]

## 2025-04-08 NOTE — TELEPHONE ENCOUNTER
Left message on answering machine for patient to call back.     Relay provider's message to patient:    The trace to mild regurgitation are not clinically significant and doesn't require additional work up.    Sincerely,    MD Ariela Hurtado RN  [facility]  [phone]

## 2025-04-09 NOTE — TELEPHONE ENCOUNTER
2nd attempt. Patient was instructed to return call to [facility] main line at [phone] to speak with an RN.    Janene Paidlla RN  [facility]